# Patient Record
Sex: MALE | Race: WHITE
[De-identification: names, ages, dates, MRNs, and addresses within clinical notes are randomized per-mention and may not be internally consistent; named-entity substitution may affect disease eponyms.]

---

## 2019-03-30 ENCOUNTER — HOSPITAL ENCOUNTER (EMERGENCY)
Dept: HOSPITAL 92 - ERS | Age: 39
Discharge: ANOTHER HOSPITAL | End: 2019-03-30
Payer: SELF-PAY

## 2019-03-30 ENCOUNTER — HOSPITAL ENCOUNTER (EMERGENCY)
Dept: HOSPITAL 9 - MADERS | Age: 39
Discharge: ANOTHER HOSPITAL | End: 2019-03-30
Payer: SELF-PAY

## 2019-03-30 DIAGNOSIS — E87.6: ICD-10-CM

## 2019-03-30 DIAGNOSIS — E10.9: ICD-10-CM

## 2019-03-30 DIAGNOSIS — Z79.899: ICD-10-CM

## 2019-03-30 DIAGNOSIS — F17.210: ICD-10-CM

## 2019-03-30 DIAGNOSIS — Z79.82: ICD-10-CM

## 2019-03-30 DIAGNOSIS — E87.4: Primary | ICD-10-CM

## 2019-03-30 DIAGNOSIS — N18.9: ICD-10-CM

## 2019-03-30 DIAGNOSIS — Z79.4: ICD-10-CM

## 2019-03-30 DIAGNOSIS — T73.0XXA: Primary | ICD-10-CM

## 2019-03-30 DIAGNOSIS — E87.1: ICD-10-CM

## 2019-03-30 DIAGNOSIS — I12.9: ICD-10-CM

## 2019-03-30 LAB
ALBUMIN SERPL BCG-MCNC: 3.8 G/DL (ref 3.5–5)
ALBUMIN SERPL BCG-MCNC: 3.9 G/DL (ref 3.5–5)
ALP SERPL-CCNC: 75 U/L (ref 40–150)
ALP SERPL-CCNC: 79 U/L (ref 40–150)
ALT SERPL W P-5'-P-CCNC: 15 U/L (ref 8–55)
ALT SERPL W P-5'-P-CCNC: 17 U/L (ref 8–55)
ANION GAP SERPL CALC-SCNC: 16 MMOL/L (ref 10–20)
ANION GAP SERPL CALC-SCNC: 23 MMOL/L (ref 10–20)
AST SERPL-CCNC: 25 U/L (ref 5–34)
AST SERPL-CCNC: 28 U/L (ref 5–34)
BACTERIA UR QL AUTO: (no result) HPF
BASOPHILS # BLD AUTO: 0 THOU/UL (ref 0–0.2)
BASOPHILS # BLD AUTO: 0.1 THOU/UL (ref 0–0.2)
BASOPHILS NFR BLD AUTO: 0.4 % (ref 0–1)
BASOPHILS NFR BLD AUTO: 1 % (ref 0–1)
BICARBONATE (HCO3V): 28.6 MMOL/L (ref 22–28)
BILIRUB SERPL-MCNC: 0.7 MG/DL (ref 0.2–1.2)
BILIRUB SERPL-MCNC: 0.8 MG/DL (ref 0.2–1.2)
BUN SERPL-MCNC: 50 MG/DL (ref 8.9–20.6)
BUN SERPL-MCNC: 56 MG/DL (ref 8.9–20.6)
CA-I BLD-SCNC: 0.97 MMOL/L
CA-I BLDV-SCNC: 0.91 MMOL/L
CALCIUM SERPL-MCNC: 9 MG/DL (ref 7.8–10.44)
CALCIUM SERPL-MCNC: 9.1 MG/DL (ref 7.8–10.44)
CHLORIDE BLDV-SCNC: 94 MMOL/L (ref 98–107)
CHLORIDE SERPL-SCNC: 85 MMOL/L (ref 98–107)
CHLORIDE SERPL-SCNC: 93 MMOL/L (ref 98–107)
CHLORIDE SERPL-SCNC: 94 MMOL/L (ref 98–107)
CO2 BLDV CALC-SCNC: 27.9 MMOL/L (ref 22–28)
CO2 BLDV CALC-SCNC: 29.5 MMOL/L (ref 22–28)
CO2 SERPL-SCNC: 28 MMOL/L (ref 22–29)
CO2 SERPL-SCNC: 30 MMOL/L (ref 22–29)
CO2 TENSION (PVCO2): 28.6 MMHG (ref 40–50)
CREAT CL PREDICTED SERPL C-G-VRATE: 0 ML/MIN (ref 70–130)
CREAT CL PREDICTED SERPL C-G-VRATE: 0 ML/MIN (ref 70–130)
EOSINOPHIL # BLD AUTO: 0 THOU/UL (ref 0–0.7)
EOSINOPHIL # BLD AUTO: 0 THOU/UL (ref 0–0.7)
EOSINOPHIL NFR BLD AUTO: 0.1 % (ref 0–10)
EOSINOPHIL NFR BLD AUTO: 0.2 % (ref 0–10)
GLOBULIN SER CALC-MCNC: 2.7 G/DL (ref 2.4–3.5)
GLOBULIN SER CALC-MCNC: 3.5 G/DL (ref 2.4–3.5)
GLUCOSE SERPL-MCNC: 132 MG/DL (ref 70–105)
GLUCOSE SERPL-MCNC: 145 MG/DL (ref 70–105)
HCO3 BLDV-SCNC: 27.2 MMOL/L (ref 22–28)
HCT VFR BLD CALC: 38 % (ref 42–52)
HCT VFR BLD CALC: 39 % (ref 42–52)
HEMOGLOBIN - CALC: 13.3 G/DL (ref 14–18)
HGB BLD CALC-MCNC: 13 G/DL (ref 14–18)
HGB BLD-MCNC: 12.3 G/DL (ref 14–18)
HGB BLD-MCNC: 12.7 G/DL (ref 14–18)
LYMPHOCYTES # BLD AUTO: 1.5 THOU/UL (ref 1.2–3.4)
LYMPHOCYTES # BLD: 2 THOU/UL (ref 1.2–3.4)
LYMPHOCYTES NFR BLD AUTO: 18.5 % (ref 21–51)
LYMPHOCYTES NFR BLD AUTO: 26.2 % (ref 21–51)
MAGNESIUM SERPL-MCNC: 2.2 MG/DL (ref 1.6–2.6)
MCH RBC QN AUTO: 29.6 PG (ref 27–31)
MCH RBC QN AUTO: 30.8 PG (ref 27–31)
MCV RBC AUTO: 87.9 FL (ref 78–98)
MCV RBC AUTO: 90.2 FL (ref 78–98)
MONOCYTES # BLD AUTO: 0.6 THOU/UL (ref 0.11–0.59)
MONOCYTES # BLD AUTO: 0.8 THOU/UL (ref 0.11–0.59)
MONOCYTES NFR BLD AUTO: 10.1 % (ref 0–10)
MONOCYTES NFR BLD AUTO: 6.7 % (ref 0–10)
NEUTROPHILS # BLD AUTO: 4.8 THOU/UL (ref 1.4–6.5)
NEUTROPHILS # BLD AUTO: 6 THOU/UL (ref 1.4–6.5)
NEUTROPHILS NFR BLD AUTO: 62.5 % (ref 42–75)
NEUTROPHILS NFR BLD AUTO: 74.2 % (ref 42–75)
O2 TENSION (PVO2): 199.1 MMHG (ref 35–45)
O2 TENSION (PVO2): 46.4 MMHG (ref 35–45)
PCO2 BLDV: 22.7 MMHG (ref 40–50)
PLATELET # BLD AUTO: 226 THOU/UL (ref 130–400)
PLATELET # BLD AUTO: 240 THOU/UL (ref 130–400)
POTASSIUM BLDV-SCNC: 3.1 MMOL/L (ref 3.5–5.1)
POTASSIUM SERPL-SCNC: 3.3 MMOL/L (ref 3.5–5.1)
POTASSIUM SERPL-SCNC: 3.5 MMOL/L (ref 3.5–5.1)
POTASSIUM SERPL-SCNC: 3.9 MMOL/L (ref 3.5–5.1)
PROT UR STRIP.AUTO-MCNC: 30 MG/DL
RBC # BLD AUTO: 4 MILL/UL (ref 4.7–6.1)
RBC # BLD AUTO: 4.28 MILL/UL (ref 4.7–6.1)
RBC UR QL AUTO: (no result) HPF (ref 0–3)
SAO2 % BLDV FROM PO2: 89.6 % (ref 60–85)
SAO2 % BLDV FROM PO2: 99.9 % (ref 60–85)
SODIUM BLDV-SCNC: 129 MMOL/L (ref 138–145)
SODIUM SERPL-SCNC: 132 MMOL/L (ref 136–145)
SODIUM SERPL-SCNC: 132 MMOL/L (ref 138–145)
SODIUM SERPL-SCNC: 135 MMOL/L (ref 136–145)
SP GR UR STRIP: 1.01 (ref 1–1.04)
SP GR UR STRIP: 1.01 (ref 1–1.04)
SPERM UR QL AUTO: (no result) HPF
WBC # BLD AUTO: 7.7 THOU/UL (ref 4.8–10.8)
WBC # BLD AUTO: 8.1 THOU/UL (ref 4.8–10.8)

## 2019-03-30 PROCEDURE — 82803 BLOOD GASES ANY COMBINATION: CPT

## 2019-03-30 PROCEDURE — 96361 HYDRATE IV INFUSION ADD-ON: CPT

## 2019-03-30 PROCEDURE — 36415 COLL VENOUS BLD VENIPUNCTURE: CPT

## 2019-03-30 PROCEDURE — 82010 KETONE BODYS QUAN: CPT

## 2019-03-30 PROCEDURE — 82435 ASSAY OF BLOOD CHLORIDE: CPT

## 2019-03-30 PROCEDURE — 82330 ASSAY OF CALCIUM: CPT

## 2019-03-30 PROCEDURE — 81003 URINALYSIS AUTO W/O SCOPE: CPT

## 2019-03-30 PROCEDURE — 36416 COLLJ CAPILLARY BLOOD SPEC: CPT

## 2019-03-30 PROCEDURE — 84132 ASSAY OF SERUM POTASSIUM: CPT

## 2019-03-30 PROCEDURE — 84295 ASSAY OF SERUM SODIUM: CPT

## 2019-03-30 PROCEDURE — 85025 COMPLETE CBC W/AUTO DIFF WBC: CPT

## 2019-03-30 PROCEDURE — 85014 HEMATOCRIT: CPT

## 2019-03-30 PROCEDURE — 96375 TX/PRO/DX INJ NEW DRUG ADDON: CPT

## 2019-03-30 PROCEDURE — 96365 THER/PROPH/DIAG IV INF INIT: CPT

## 2019-03-30 PROCEDURE — 80053 COMPREHEN METABOLIC PANEL: CPT

## 2019-03-30 PROCEDURE — 81015 MICROSCOPIC EXAM OF URINE: CPT

## 2019-03-30 PROCEDURE — 99292 CRITICAL CARE ADDL 30 MIN: CPT

## 2019-03-30 PROCEDURE — 96374 THER/PROPH/DIAG INJ IV PUSH: CPT

## 2019-03-30 PROCEDURE — 83605 ASSAY OF LACTIC ACID: CPT

## 2019-03-30 PROCEDURE — 71045 X-RAY EXAM CHEST 1 VIEW: CPT

## 2019-03-30 PROCEDURE — 93005 ELECTROCARDIOGRAM TRACING: CPT

## 2019-03-30 PROCEDURE — 83735 ASSAY OF MAGNESIUM: CPT

## 2019-03-30 PROCEDURE — 96366 THER/PROPH/DIAG IV INF ADDON: CPT

## 2019-03-30 NOTE — RAD
PORTABLE CHEST:

3/30/19

 

HISTORY: 

Possible infection. 

 

Lungs appear clear. No infiltrate identified. Heart and mediastinum unremarkable. 

 

IMPRESSION:  

No acute findings. 

 

POS: [facility]

## 2019-03-30 NOTE — HP
PRIMARY CARE PROVIDER:  Lisa Bowling.



CHIEF COMPLAINT:  Nausea and vomiting.



HISTORY OF PRESENT ILLNESS:  Mr. Kaur is a pleasant 38-year-old gentleman, who was

seen at [facility] following transfer from [place]

[facility] on March 30, 2019. 



He reports that over the last 4 or 5 days, he has been having severe nausea and

vomiting.  He reports that he is unable to tolerate any diet.  He denies any fevers

or chills.  He denies any chest pain.  Denies any abdominal pain.  He reports

generalized weakness. 



REVIEW OF SYSTEMS:  All other systems reviewed and found to be negative.



PAST MEDICAL HISTORY:  Diabetes mellitus type 1; chronic kidney disease stage 3,

followed by Dr. Tyler; diabetic foot ulcer requiring left below-knee amputation;

retinal detachment. 



PAST SURGICAL HISTORY:  Vitrectomy and left below-knee amputation.



ALLERGIES:  NO KNOWN DRUG ALLERGIES.



CURRENT MEDICATIONS:  He is on Lantus insulin 36 units at bedtime.  He is also on

Humalog insulin by sliding scale. 



SOCIAL HISTORY:  He vapes.  He denies alcohol use or recreational drug use.



FAMILY HISTORY:  Hypertension in his father.



PHYSICAL EXAMINATION:

GENERAL:  On examination, Mr. Kaur is awake and alert, not in acute distress. 

VITAL SIGNS:  Blood pressure is 163/90, pulse 85, respiratory rate 18, and oxygen

saturation 94% on room air.  He is afebrile. 

EYES:  No scleral icterus.  No conjunctival pallor. 

ENT:  Dry mucosal membranes.  No oropharyngeal erythema or exudates. 

NECK:  Supple, nontender, trachea is midline. 

RESPIRATORY:  Accessory muscles of breathing are not active.  Chest wall movements

are symmetric bilaterally. 

LUNGS:  Clear to auscultation without wheeze, rhonchi, or crepitations. 

CARDIOVASCULAR:  S1 and S2 are heard, regular.  Peripheral pulses palpable.  No

carotid bruit.  No pericardial rub. 

ABDOMEN:  Soft, nontender.  Bowel sounds heard.  No hepatomegaly.  No splenomegaly. 

NEUROLOGIC:  Cranial nerves 2 through 12 are intact. 

MUSCULOSKELETAL:  The patient is able to move all four extremities. 

SKIN:  No rashes or subcutaneous nodules. 

LYMPHATIC:  No cervical lymphadenopathy. 

PSYCHIATRIC:  Normal mood.  Normal affect.  The patient is oriented to person,

place, and time. 



LABORATORY DATA:  Mr. Kaur's labs and investigations were reviewed.  He has normal

white count, normocytic anemia with hemoglobin 12.3, normal platelet count.  Venous

blood gas showing pH 7.6, pCO2 of 28.6, and pO2 of 46.4.  Urinalysis that is

positive for trace amount of ketones.  Beta-hydroxybutyrate is mildly elevated at

1.07.  He is hyponatremic with a sodium of 132; hypokalemic with a potassium of 3.3;

he has elevated blood urea nitrogen of 50; elevated creatinine of 2.61, last known

creatinine 2.77 earlier today and 2.31 on September 2, 2017.  Had unremarkable liver

profile.  Anion gap is 16.  Bicarb is elevated at 30. 



ASSESSMENT AND PLAN:  Mr. Kaur is a pleasant 38-year-old gentleman, who was seen at

[facility] on March 30, 2019.  His problem list includes: 

1. Nausea and vomiting:  Mr. Kaur is presenting with nausea and vomiting of unknown

etiology, could be secondary to viral gastroenteritis.  He will be admitted to the

hospital for further management.  We will provide him intravenous fluids for

dehydration.  We will start him on clear fluid diet and see if he can tolerate it. 

2. Acute on chronic stage 3 renal failure:  We will provide hydration and recheck

creatinine.  This is most likely prerenal given his history of recent nausea and

vomiting.  We will also consult Nephrology Service for opinion and help with

management. 

3. Diabetes mellitus type 1:  Initially, concern was regarding whether he had

diabetic ketoacidosis.  However, given the normal anion gap and elevated bicarb,

this does not appear likely.  He will be treated with intravenous fluids.  He will

also receive Lantus insulin. 

4. Metabolic alkalosis:  Most likely secondary to vomiting.  We will provide

intravenous hydration and recheck. 



Many thanks for allowing me to participate in your patient's care.  Please feel free

to contact me with any questions or concerns. 



LEVEL OF RISK:  Moderate.



LEVEL OF COMPLEXITY:  Moderate.







Job ID:  [id number]

## 2019-03-31 NOTE — CON
DATE OF CONSULTATION:  



REASON FOR CONSULTATION:  Elevated creatinine.



HISTORY OF PRESENT ILLNESS:  This is a very pleasant 38-year-old gentleman being

followed at the [facility] in [place].  He presents to the hospital with a 2-day

history of nausea and vomiting.  The patient denies chest pain.  No orthopnea, no

PND.  The patient has a history of diabetes mellitus and has a history of CKD stage

3 to stage 4.  Creatinine has been anywhere from 2 to 3 in the last few years.  The

patient has had significant vomiting, diarrhea, and an elevated bicarbonate. 



PAST MEDICAL HISTORY:  Significant for diabetes mellitus type 2, history of

diabetic, retinal detachment, vitrectomy, history of left knee below-knee

amputation, history of cardiac CKD stage 3 to 4. 



ALLERGIES:  NO KNOWN DRUG ALLERGIES.



FAMILY HISTORY:  Negative for ESRD.



HOME MEDICATION:  Medication list reviewed.



REVIEW OF SYSTEMS:  A 15-point review of system was performed negative except for

positives noted above. 



REVIEW OF SYSTEMS:  A 12-point review of systems was performed and was negative

except for positives noted above. 

NECK:  No swelling or lumps. 

NOSE:  No epistaxis or discharge.   

EYES:  No diplopia or pain. 

MUSCULOSKELETAL:  No joint pain. 

NEUROPSYCHIATIC SYSTEMS:  No suicidal ideation.  No ideation. 

SKIN:  Denies any rash or ulcer. 

CONSTITUTIONAL:   No fever or chills.



OBJECTIVE:  See above. 

Awake, alert, in no acute distress. 

VITAL SIGNS:  Have been reviewed. 

GENERAL APPEARANCE AND MENTAL STATUS:  Fair. 

HEAD/NECK:  Normocephalic.   Atraumatic. 

EYES:  EOMI.  No deformity. 

EARS:  Clear.  No ulcers. 

NOSE:   Intact.  No lesions. 

MOUTH:  Clear.  No discharge. 

THROAT:  Clear.  No exudate. 

LUNGS:   Clear.  No crackles. 

CARDIAC:   S1, S2.  No rub. 

ABDOMEN:   Benign.  Bowel sounds positive. 

GENITALIA/RECTUM:  Hebert absent. 

BACK/EXTREMITIES:  Edema 0+. 

NEUROLOGICAL:  Alert and motor intact.



LABORATORY DATA:  Show potassium is 3.9.



ASSESSMENT AND PLAN:  

1. Chronic kidney disease, stage 4 with acute kidney injury, likely due to decreased

effective arterial blood volume.  I will continue normal saline at 50 mL/hour. 

2. Hypertension, stable.

3. Anemia, stable. Medication based on GFR appropriate.  No indication for dialysis

at this time. 







Job ID:  [id number]

## 2019-04-01 LAB
BASE EXCESS BLDA CALC-SCNC: 7.5 MMOL/L
CA-I BLDA-SCNC: 1 MMOL/L (ref 1.15–1.33)
CHLORIDE BLDA-SCNC: 93 MMOL/L (ref 98–107)
HCT VFR BLDA CALC: 36 % (ref 38–51)
HGB BLD CALC-MCNC: 12.2 G/DL (ref 12–17)
PCO2 BLDA: 24.7 MMHG (ref 35–48)
PH BLDA: 7.65 [PH] (ref 7.35–7.45)
PO2 BLDA: 79.3 MMHG (ref 83–108)
POTASSIUM BLDA-SCNC: 3.2 MMOL/L (ref 3.5–4.5)
SAO2 % BLDA FROM PO2: 98 % (ref 94–98)
SODIUM BLDA-SCNC: 129 MMOL/L (ref 138–146)

## 2019-04-01 NOTE — DIS
DATE OF ADMISSION:  03/30/2019



DATE OF DISCHARGE:  03/30/2019



The patient left against medical advice on March 30, 2019.



HOSPITAL COURSE:  Mr. Kaur is a pleasant 38-year-old gentleman, who was admitted to

[facility] on March 30, 2019 for nausea and vomiting as well

as acute on chronic stage 3 renal failure.  Please refer to my history and physical

note, dated March 30, 2019, for further details. 



Shortly after admission, Mr. Kaur left the hospital against medical advice.







Job ID:  [id number]

## 2020-02-25 ENCOUNTER — HOSPITAL ENCOUNTER (INPATIENT)
Dept: HOSPITAL 92 - ERS | Age: 40
LOS: 2 days | Discharge: HOME | DRG: 391 | End: 2020-02-27
Attending: HOSPITALIST | Admitting: INTERNAL MEDICINE
Payer: COMMERCIAL

## 2020-02-25 ENCOUNTER — HOSPITAL ENCOUNTER (EMERGENCY)
Dept: HOSPITAL 9 - MADERS | Age: 40
Discharge: ANOTHER HOSPITAL | End: 2020-02-25
Payer: MEDICAID

## 2020-02-25 VITALS — BODY MASS INDEX: 26.8 KG/M2

## 2020-02-25 DIAGNOSIS — E10.22: ICD-10-CM

## 2020-02-25 DIAGNOSIS — E10.10: ICD-10-CM

## 2020-02-25 DIAGNOSIS — Z79.4: ICD-10-CM

## 2020-02-25 DIAGNOSIS — Z79.82: ICD-10-CM

## 2020-02-25 DIAGNOSIS — A08.4: Primary | ICD-10-CM

## 2020-02-25 DIAGNOSIS — E10.43: ICD-10-CM

## 2020-02-25 DIAGNOSIS — E78.5: ICD-10-CM

## 2020-02-25 DIAGNOSIS — E10.10: Primary | ICD-10-CM

## 2020-02-25 DIAGNOSIS — F17.210: ICD-10-CM

## 2020-02-25 DIAGNOSIS — N17.9: ICD-10-CM

## 2020-02-25 DIAGNOSIS — Z79.899: ICD-10-CM

## 2020-02-25 DIAGNOSIS — N28.9: ICD-10-CM

## 2020-02-25 DIAGNOSIS — I10: ICD-10-CM

## 2020-02-25 DIAGNOSIS — Z89.512: ICD-10-CM

## 2020-02-25 DIAGNOSIS — N18.4: ICD-10-CM

## 2020-02-25 DIAGNOSIS — E10.59: ICD-10-CM

## 2020-02-25 DIAGNOSIS — K31.84: ICD-10-CM

## 2020-02-25 DIAGNOSIS — F17.290: ICD-10-CM

## 2020-02-25 LAB
ALBUMIN SERPL BCG-MCNC: 4.1 G/DL (ref 3.5–5)
ALP SERPL-CCNC: 71 U/L (ref 40–110)
ALT SERPL W P-5'-P-CCNC: 16 U/L (ref 8–55)
ANION GAP SERPL CALC-SCNC: 17 MMOL/L (ref 10–20)
ANION GAP SERPL CALC-SCNC: 19 MMOL/L (ref 10–20)
ANION GAP SERPL CALC-SCNC: 19 MMOL/L (ref 10–20)
ANION GAP SERPL CALC-SCNC: 31 MMOL/L (ref 10–20)
AST SERPL-CCNC: 15 U/L (ref 5–34)
BASOPHILS # BLD AUTO: 0 THOU/UL (ref 0–0.2)
BASOPHILS NFR BLD AUTO: 0.3 % (ref 0–1)
BILIRUB SERPL-MCNC: 0.5 MG/DL (ref 0.2–1.2)
BUN SERPL-MCNC: 59 MG/DL (ref 8.9–20.6)
BUN SERPL-MCNC: 60 MG/DL (ref 8.9–20.6)
CALCIUM SERPL-MCNC: 9.2 MG/DL (ref 7.8–10.44)
CALCIUM SERPL-MCNC: 9.7 MG/DL (ref 7.8–10.44)
CALCIUM SERPL-MCNC: 9.7 MG/DL (ref 7.8–10.44)
CALCIUM SERPL-MCNC: 9.8 MG/DL (ref 7.8–10.44)
CHLORIDE SERPL-SCNC: 105 MMOL/L (ref 98–107)
CHLORIDE SERPL-SCNC: 106 MMOL/L (ref 98–107)
CHLORIDE SERPL-SCNC: 106 MMOL/L (ref 98–107)
CHLORIDE SERPL-SCNC: 96 MMOL/L (ref 98–107)
CO2 SERPL-SCNC: 16 MMOL/L (ref 22–29)
CO2 SERPL-SCNC: 23 MMOL/L (ref 22–29)
CO2 SERPL-SCNC: 24 MMOL/L (ref 22–29)
CO2 SERPL-SCNC: 26 MMOL/L (ref 22–29)
CREAT CL PREDICTED SERPL C-G-VRATE: 0 ML/MIN (ref 70–130)
EOSINOPHIL # BLD AUTO: 0 THOU/UL (ref 0–0.7)
EOSINOPHIL NFR BLD AUTO: 0 % (ref 0–10)
GLOBULIN SER CALC-MCNC: 3.2 G/DL (ref 2.4–3.5)
GLUCOSE SERPL-MCNC: 159 MG/DL (ref 70–105)
GLUCOSE SERPL-MCNC: 178 MG/DL (ref 70–105)
GLUCOSE SERPL-MCNC: 211 MG/DL (ref 70–105)
GLUCOSE SERPL-MCNC: 646 MG/DL (ref 70–105)
HGB BLD-MCNC: 11.7 G/DL (ref 14–18)
LYMPHOCYTES # BLD AUTO: 1.1 THOU/UL (ref 1.2–3.4)
LYMPHOCYTES NFR BLD AUTO: 7.5 % (ref 21–51)
MAGNESIUM SERPL-MCNC: 1.9 MG/DL (ref 1.6–2.6)
MCH RBC QN AUTO: 29 PG (ref 27–31)
MCV RBC AUTO: 93.9 FL (ref 78–98)
MONOCYTES # BLD AUTO: 0.7 THOU/UL (ref 0.11–0.59)
MONOCYTES NFR BLD AUTO: 4.5 % (ref 0–10)
NEUTROPHILS # BLD AUTO: 12.6 THOU/UL (ref 1.4–6.5)
NEUTROPHILS NFR BLD AUTO: 87.7 % (ref 42–75)
PLATELET # BLD AUTO: 230 THOU/UL (ref 130–400)
POTASSIUM SERPL-SCNC: 3.8 MMOL/L (ref 3.5–5.1)
POTASSIUM SERPL-SCNC: 3.9 MMOL/L (ref 3.5–5.1)
POTASSIUM SERPL-SCNC: 3.9 MMOL/L (ref 3.5–5.1)
POTASSIUM SERPL-SCNC: 4.7 MMOL/L (ref 3.5–5.1)
RBC # BLD AUTO: 4.05 MILL/UL (ref 4.7–6.1)
SODIUM SERPL-SCNC: 138 MMOL/L (ref 136–145)
SODIUM SERPL-SCNC: 144 MMOL/L (ref 136–145)
SODIUM SERPL-SCNC: 144 MMOL/L (ref 136–145)
SODIUM SERPL-SCNC: 145 MMOL/L (ref 136–145)
WBC # BLD AUTO: 14.3 THOU/UL (ref 4.8–10.8)

## 2020-02-25 PROCEDURE — 85025 COMPLETE CBC W/AUTO DIFF WBC: CPT

## 2020-02-25 PROCEDURE — 80053 COMPREHEN METABOLIC PANEL: CPT

## 2020-02-25 PROCEDURE — 96365 THER/PROPH/DIAG IV INF INIT: CPT

## 2020-02-25 PROCEDURE — 80048 BASIC METABOLIC PNL TOTAL CA: CPT

## 2020-02-25 PROCEDURE — 36415 COLL VENOUS BLD VENIPUNCTURE: CPT

## 2020-02-25 PROCEDURE — 81001 URINALYSIS AUTO W/SCOPE: CPT

## 2020-02-25 PROCEDURE — 83605 ASSAY OF LACTIC ACID: CPT

## 2020-02-25 PROCEDURE — 84484 ASSAY OF TROPONIN QUANT: CPT

## 2020-02-25 PROCEDURE — 93005 ELECTROCARDIOGRAM TRACING: CPT

## 2020-02-25 PROCEDURE — 96375 TX/PRO/DX INJ NEW DRUG ADDON: CPT

## 2020-02-25 PROCEDURE — 71045 X-RAY EXAM CHEST 1 VIEW: CPT

## 2020-02-25 PROCEDURE — 36416 COLLJ CAPILLARY BLOOD SPEC: CPT

## 2020-02-25 PROCEDURE — 96366 THER/PROPH/DIAG IV INF ADDON: CPT

## 2020-02-25 PROCEDURE — 96376 TX/PRO/DX INJ SAME DRUG ADON: CPT

## 2020-02-25 PROCEDURE — 74176 CT ABD & PELVIS W/O CONTRAST: CPT

## 2020-02-25 PROCEDURE — 76770 US EXAM ABDO BACK WALL COMP: CPT

## 2020-02-25 PROCEDURE — 83735 ASSAY OF MAGNESIUM: CPT

## 2020-02-25 PROCEDURE — 94760 N-INVAS EAR/PLS OXIMETRY 1: CPT

## 2020-02-25 NOTE — RAD
Portable chest:



HISTORY: Tachycardia



COMPARISON:  none



FINDINGS: Lung fields are clear. Heart and mediastinum appear unremarkable.  Vascularity is normal.

 Visualized osseous structures unremarkable.



IMPRESSION: No acute finding



Reported By: Dameon Chaudhari 

Electronically Signed:  2/25/2020 11:26 AM

## 2020-02-25 NOTE — RAD
Portable frontal chest radiograph:

2/25/2020



COMPARISON: 2/25/2020



HISTORY: Short of breath



FINDINGS: Lungs are clear. Heart and mediastinal contours appear within normal limits.



IMPRESSION: No acute findings.



Reported By: Rudolph Zimmer 

Electronically Signed:  2/25/2020 10:16 PM

## 2020-02-25 NOTE — PDOC.EVN
Event Note





- Event Note


Event Note: 





Will change Insulin drip tp SQ. Added sliding scale. DC electrolye protocol

## 2020-02-25 NOTE — ULT
Renal ultrasound:

2/25/2020



COMPARISON:None available



HISTORY:Acute kidney insufficiency



TECHNIQUE: Multiplanar grayscale sonographic imaging of the kidneys and urinary bladder obtained.



FINDINGS: The right kidney [id number] x 4.7 x 5.1 cm and demonstrates no stone, hydronephrosis, or 
mass.



The left kidney [id number] x 5.6 x 5.9 cm and demonstratesno stone, hydronephrosis, or mass lesion.




The urinary bladderappears grossly unremarkable.



IMPRESSION:No acute findings



Reported By: Rudolph Zimmer 

Electronically Signed:  2/25/2020 6:50 PM

## 2020-02-25 NOTE — PDOC.HHP
Hospitalist HPI





- History of Present Illness


DKA


History of Present Illness: 





39-year-old gentleman with past medical history of type I diabetes, hypertension

, hyperlipidemia, diabetic gastroparesis, and left below the knee amputation 

presents with five days of nausea vomiting and diarrhea, diagnosed with 

diabetic ketoacidosis. Patient was transferred to [facility] 

on 2/25/2020 with diabetic ketoacidosis. Initially on presentation patient's 

blood sugars for greater than 600 and he met criteria for diabetic 

ketoacidosis. I find the patient in the emergency department he is laying flat 

in bed in mild acute distress, his mother is at bedside able to aid in history. 

Patient tells me that he is at five days of nausea, vomiting, and diarrhea. 

Diarrhea has been watery without blood or black. Vomitus has been yellow/ green 

bilious without blood or black. Patient does state that he has been in contact 

with sick kids at home. Patient diagnosed with acute kidney injury present on 

admission. I ordered a CT scan of the abdomen and pelvis, please see full 

report for details, there is no acute intra-abdominal pathology to explain 

patient symptoms. Patient with viral gastroenteritis and diabetic ketoacidosis 

present on admission.





Hospitalist ROS





- Review of Systems


All other systems reviewed; all pertinent +/- noted in HPI/Subj





- Medication


Medications: 


Active Medications











Generic Name Dose Route Start Last Admin





  Trade Name Freq  PRN Reason Stop Dose Admin


 


Metoclopramide HCl  10 mg  02/25/20 16:12  02/25/20 19:55





  Reglan  IVP   10 mg





  Q6H PRN   Administration





  Nausea/Vomiting   





     





     





     














Hospitalist History





- Past Medical History


Source: patient, family, old records


Cardiac: reports: HTN, Hyperlipidemia


Gastrointestinal: reports: Other (DM gastroparesis)


Musculoskeletal: reports: Other (Left BKA)





- Past Surgical History


Past Surgical History: reports: Other (Left BKA)





- Social History


Smoking Status: Current every day smoker (E-cigarettes)


Alcohol: reports: None


Drugs: reports: none


Living Situation: With Family


Domestic Violence: Negative


Activity level: uses cane/walker





- Exam


General Appearance: ill appearing


Eye: PERRL


ENT: normocephalic atraumatic, moist mucosa


Neck: supple, symmetric, no lymphadenopathy


Heart: no murmur, no gallops, no rubs, normal peripheral pulses


Respiratory: CTAB, no wheezes, no rales, no ronchi, normal chest expansion, no 

tachypnea


Gastrointestinal: soft, non-tender, non-distended, no guarding, no rigidity


Extremities: no edema


Skin: no lesions, no rashes


Skin - other findings: Right foot no lesions or infections


Neurological: cranial nerve grossly intact, normal sensation to touch, no focal 

deficits


Musculoskeletal: generalized weakness


Musculoskeletal - other findings: Left BKA.


Psychiatric: normal affect, normal behavior, A&O x 3





Hospitalist Results





- Labs


Result Diagrams: 


 02/25/20 16:42


Lab results: 


 











Sodium  144 mmol/L (136-145)   02/25/20  16:42    


 


Potassium  3.9 mmol/L (3.5-5.1)   02/25/20  16:42    


 


Chloride  106 mmol/L ()   02/25/20  16:42    


 


Carbon Dioxide  23 mmol/L (22-29)   02/25/20  16:42    


 


BUN  60 mg/dL (8.9-20.6)  H  02/25/20  16:42    


 


Creatinine  3.87 mg/dL (0.7-1.3)  H  02/25/20  16:42    


 


Glucose  159 mg/dL ()  H  02/25/20  16:42    


 


Calcium  9.7 mg/dL (7.8-10.44)   02/25/20  16:42    














- Radiology Interpretation


  ** CT scan - abdomen


Status: image reviewed by me





Hospitalist H&P A/P





- Problem


(1) DKA (diabetic ketoacidoses)


Code(s): E11.10 - TYPE 2 DIABETES MELLITUS WITH KETOACIDOSIS WITHOUT COMA   

Status: Acute   





(2) HTN (hypertension)


Code(s): I10 - ESSENTIAL (PRIMARY) HYPERTENSION   Status: Acute   





(3) HLD (hyperlipidemia)


Code(s): E78.5 - HYPERLIPIDEMIA, UNSPECIFIED   Status: Acute   





(4) Gastroparesis due to DM


Code(s): E11.43 - TYPE 2 DIABETES W DIABETIC AUTONOMIC (POLY)NEUROPATHY; K31.84 

- GASTROPARESIS   Status: Acute   





(5) Viral gastroenteritis


Code(s): A08.4 - VIRAL INTESTINAL INFECTION, UNSPECIFIED   Status: Acute   





(6) ANTONY (acute kidney injury)


Code(s): N17.9 - ACUTE KIDNEY FAILURE, UNSPECIFIED   Status: Acute   





(7) Diabetes


Code(s): E11.9 - TYPE 2 DIABETES MELLITUS WITHOUT COMPLICATIONS   Status: Acute

   


Qualifiers: 


   Diabetes mellitus type: type 1 





(8) Intractable nausea and vomiting


Code(s): R11.2 - NAUSEA WITH VOMITING, UNSPECIFIED   Status: Acute   





- Plan


Plan: 





Plan:


admit to intermediate medical care unit


DKA protocol


insulin drip


replace electrolytes as needed


IV fluid resuscitation for acute kidney injury


Renal US negative for obstructive process to explain ANTONY


CT scan of of the abdomen and pelvis demonstrates no acute intra-abdominal 

pathology to to explain nausea, vomiting, and diarrhea


viral gastroenteritis will need to run its course


Stress reactive leukocytosis at 14k from transferring facility


no indication for antibiotics


Add UA and CXR 


No skin infections on the feet or stump


continue home medications as able


Hold Ace inh and statin with ANTONY


symptomatic control of nausea and vomiting


Patient with DM gastroparesis 


blood pressure control 


blood sugar control


GI prophylaxis


DVT prophylaxis

## 2020-02-25 NOTE — CT
CT OF THE ABDOMEN AND PELVIS WITHOUT IV CONTRAST



INDICATION: History of DKA and abdominal pain



COMPARISON: CT the abdomen and pelvis without contrast dated July 2, 2012



FINDINGS: The lack of IV contrast limits evaluation of the solid organs of the abdomen and pelvis.





ABDOMEN:



Lung bases: Clear



Liver: No focal lesion.



Gallbladder:  Normal appearing.



Pancreas: Normal.



Adrenal glands: Normal.



Spleen: Normal.



Kidneys and ureters: Normal.  No hydronephrosis.



Vasculature: There are mild vascular calcifications seen involving the visualized vasculature.



Lymph nodes:No lymphadenopathy.



Free fluid in abdomen:No free fluid is evident.





PELVIS:



Small and large bowel: Normal



Appendix:Normal



Bladder: Normal.



Rectal and perirectal soft tissues:Normal.



Reproductive structures: Normal.



Free fluid in pelvis: No free fluid is evident.



Lymphadenopathy pelvis: No lymphadenopathy is evident.



Osseous structures: No acute osseous abnormality.  No destructive osteolytic or osteoblastic lesion i
s identified.  There is scattered degenerative and osteoarthritic changes.



Soft tissues:Normal.



IMPRESSION:

1. No acute abnormality.



Reported By: Dae Clarke 

Electronically Signed:  2/25/2020 4:56 PM

## 2020-02-26 LAB
ANION GAP SERPL CALC-SCNC: 17 MMOL/L (ref 10–20)
BASOPHILS # BLD AUTO: 0 THOU/UL (ref 0–0.2)
BASOPHILS NFR BLD AUTO: 0.1 % (ref 0–1)
BUN SERPL-MCNC: 57 MG/DL (ref 8.9–20.6)
CALCIUM SERPL-MCNC: 9.1 MG/DL (ref 7.8–10.44)
CHLORIDE SERPL-SCNC: 101 MMOL/L (ref 98–107)
CO2 SERPL-SCNC: 27 MMOL/L (ref 22–29)
CREAT CL PREDICTED SERPL C-G-VRATE: 0 ML/MIN (ref 70–130)
EOSINOPHIL # BLD AUTO: 0 THOU/UL (ref 0–0.7)
EOSINOPHIL NFR BLD AUTO: 0.1 % (ref 0–10)
GLUCOSE SERPL-MCNC: 232 MG/DL (ref 70–105)
GLUCOSE UR STRIP-MCNC: >=1000 MG/DL
HGB BLD-MCNC: 10.9 G/DL (ref 14–18)
LYMPHOCYTES # BLD: 1.7 THOU/UL (ref 1.2–3.4)
LYMPHOCYTES NFR BLD AUTO: 10.8 % (ref 21–51)
MCH RBC QN AUTO: 30.2 PG (ref 27–31)
MCV RBC AUTO: 93.3 FL (ref 78–98)
MONOCYTES # BLD AUTO: 0.9 THOU/UL (ref 0.11–0.59)
MONOCYTES NFR BLD AUTO: 5.9 % (ref 0–10)
NEUTROPHILS # BLD AUTO: 13.3 THOU/UL (ref 1.4–6.5)
NEUTROPHILS NFR BLD AUTO: 83.2 % (ref 42–75)
PLATELET # BLD AUTO: 208 THOU/UL (ref 130–400)
POTASSIUM SERPL-SCNC: 3.9 MMOL/L (ref 3.5–5.1)
PROT UR STRIP.AUTO-MCNC: 70 MG/DL
RBC # BLD AUTO: 3.62 MILL/UL (ref 4.7–6.1)
RBC UR QL AUTO: (no result) HPF (ref 0–3)
SODIUM SERPL-SCNC: 141 MMOL/L (ref 136–145)
WBC # BLD AUTO: 16 THOU/UL (ref 4.8–10.8)
WBC UR QL AUTO: (no result) HPF (ref 0–3)

## 2020-02-26 RX ADMIN — ASPIRIN 81 MG CHEWABLE TABLET SCH: 81 TABLET CHEWABLE at 16:51

## 2020-02-26 RX ADMIN — HEPARIN SODIUM SCH UNITS: 5000 INJECTION, SOLUTION INTRAVENOUS; SUBCUTANEOUS at 16:28

## 2020-02-26 RX ADMIN — Medication SCH: at 09:21

## 2020-02-26 RX ADMIN — INSULIN HUMAN PRN UNIT: 100 INJECTION, SOLUTION PARENTERAL at 05:50

## 2020-02-26 RX ADMIN — ONDANSETRON PRN MG: 2 INJECTION INTRAMUSCULAR; INTRAVENOUS at 16:28

## 2020-02-26 RX ADMIN — ONDANSETRON PRN MG: 2 INJECTION INTRAMUSCULAR; INTRAVENOUS at 09:15

## 2020-02-26 RX ADMIN — INSULIN GLARGINE SCH MLS: 100 INJECTION, SOLUTION SUBCUTANEOUS at 09:22

## 2020-02-26 RX ADMIN — INSULIN HUMAN PRN UNIT: 100 INJECTION, SOLUTION PARENTERAL at 13:08

## 2020-02-26 RX ADMIN — ONDANSETRON PRN MG: 2 INJECTION INTRAMUSCULAR; INTRAVENOUS at 00:01

## 2020-02-26 RX ADMIN — INSULIN HUMAN PRN UNIT: 100 INJECTION, SOLUTION PARENTERAL at 08:14

## 2020-02-26 RX ADMIN — HEPARIN SODIUM SCH UNITS: 5000 INJECTION, SOLUTION INTRAVENOUS; SUBCUTANEOUS at 09:17

## 2020-02-26 RX ADMIN — ASPIRIN 81 MG CHEWABLE TABLET SCH MG: 81 TABLET CHEWABLE at 09:17

## 2020-02-26 RX ADMIN — HEPARIN SODIUM SCH: 5000 INJECTION, SOLUTION INTRAVENOUS; SUBCUTANEOUS at 01:08

## 2020-02-26 RX ADMIN — HEPARIN SODIUM SCH UNITS: 5000 INJECTION, SOLUTION INTRAVENOUS; SUBCUTANEOUS at 21:14

## 2020-02-26 NOTE — PDOC.HOSPP
- Subjective


Encounter Date: 02/26/20


Encounter Time: 11:56


Subjective: 





still nauseated. no po intake yet. d/w pt, mom and rn.





- Objective


Vital Signs & Weight: 


 Vital Signs (12 hours)











  Temp Pulse Resp BP Pulse Ox


 


 02/26/20 11:57  98.7 F  94  20  178/89 H 


 


 02/26/20 08:09  98.8 F  87  20  164/84 H  96


 


 02/26/20 04:35  98.8 F  91  18  170/83 H  95


 


 02/26/20 04:00      98








 Weight











Weight                         250 lb 0.067 oz














I&O: 


 











 02/25/20 02/26/20 02/27/20





 06:59 06:59 06:59


 


Intake Total  2550 


 


Output Total  1800 


 


Balance  750 











Result Diagrams: 


 02/26/20 05:54





 02/26/20 05:54


Additional Labs: 


 Accuchecks











  02/26/20 02/26/20 02/26/20





  11:18 08:12 05:17


 


POC Glucose  158 H  202 H  235 H














  02/25/20 02/25/20 02/25/20





  23:24 22:32 21:28


 


POC Glucose  177 H  158 H  200 H














  02/25/20 02/25/20 02/25/20





  20:23 19:20 17:48


 


POC Glucose  157 H  174 H  143 H














  02/25/20





  16:59


 


POC Glucose  139 H














Hospitalist ROS





- Medication


Medications: 


Active Medications











Generic Name Dose Route Start Last Admin





  Trade Name Freq  PRN Reason Stop Dose Admin


 


Aspirin  81 mg  02/26/20 09:00  02/26/20 09:17





  Aspirin Chewable  PO   81 mg





  DAILY PASQUALE   Administration





     





     





     





     


 


Famotidine  20 mg  02/25/20 21:00  02/26/20 01:44





  Pepcid  PO   Not Given





  QPM PASQUALE   





     





     





     





     


 


Heparin Sodium (Porcine)  5,000 units  02/25/20 21:00  02/26/20 09:17





  Heparin  SC   5,000 units





  TID PASQUALE   Administration





     





     





     





     


 


Sodium Chloride  1,000 mls @ 150 mls/hr  02/25/20 23:30  02/26/20 09:15





  1/2 Normal Saline  IV   1,000 mls





  .Q6H40M PASQUALE   Administration





     





     





     





     


 


Promethazine HCl 25 mg/ Sodium  51 mls @ 204 mls/hr  02/26/20 03:02  02/26/20 13

:09





  Chloride  IVPB   51 mls





  Q6H PRN   Administration





  Nausea   





     





     





     


 


Insulin Glargine 14 units/  0.14 mls @ 0 mls/hr  02/26/20 09:00  02/26/20 09:22





  Miscellaneous Medication  SC   0.14 mls





  QAM PASQUALE   Administration





     





     





     





     


 


Insulin Human Regular  0 units  02/25/20 23:16  02/26/20 13:08





  Humulin R  SC   2 unit





  .MODERATE SLIDING SC PRN   Administration





  Moderate Correctional Scale   





     





     





     


 


Metoclopramide HCl  10 mg  02/25/20 16:12  02/25/20 19:55





  Reglan  IVP   10 mg





  Q6H PRN   Administration





  Nausea/Vomiting   





     





     





     


 


Metoprolol Tartrate  25 mg  02/26/20 09:00  02/26/20 09:17





  Lopressor  PO   25 mg





  BID PASQUALE   Administration





     





     





     





     


 


Ondansetron HCl  4 mg  02/25/20 16:16  02/26/20 09:15





  Zofran  IVP   4 mg





  Q6H PRN   Administration





  Nausea/Vomiting   





     





     





     


 


Sodium Chloride  10 ml  02/26/20 09:00  02/26/20 09:21





  Flush - Normal Saline  IVF   Not Given





  Q12HR PASQUALE   





     





     





     





     














- Exam


General Appearance: NAD, awake alert


Eye: PERRL


ENT: normocephalic atraumatic


Neck: supple


Heart: RRR


Respiratory: CTAB


Gastrointestinal: normal bowel sounds





Hosp A/P





- Plan








(1) DKA (diabetic ketoacidoses)


Code(s): E11.10 - TYPE 2 DIABETES MELLITUS WITH KETOACIDOSIS WITHOUT COMA   

Status: Acute   





(2) HTN (hypertension)


Code(s): I10 - ESSENTIAL (PRIMARY) HYPERTENSION   Status: Acute   





(3) HLD (hyperlipidemia)


Code(s): E78.5 - HYPERLIPIDEMIA, UNSPECIFIED   Status: Acute   





(4) Gastroparesis due to DM


Code(s): E11.43 - TYPE 2 DIABETES W DIABETIC AUTONOMIC (POLY)NEUROPATHY; K31.84 

- GASTROPARESIS   Status: Acute   





(5) Viral gastroenteritis


Code(s): A08.4 - VIRAL INTESTINAL INFECTION, UNSPECIFIED   Status: Acute   





(6) ANTONY (acute kidney injury)


Code(s): N17.9 - ACUTE KIDNEY FAILURE, UNSPECIFIED   Status: Acute   





(7) Diabetes


Code(s): E11.9 - TYPE 2 DIABETES MELLITUS WITHOUT COMPLICATIONS   Status: Acute

   


Qualifiers: 


   Diabetes mellitus type: type 1 





(8) Intractable nausea and vomiting


Code(s): R11.2 - NAUSEA WITH VOMITING, UNSPECIFIED   Status: Acute   





DKA protocol


insulin drip---STopped


replace electrolytes as needed


IV fluid resuscitation for acute kidney injury


Renal US negative for obstructive process to explain ANTONY---neg


CT scan of of the abdomen and pelvis demonstrates no acute intra-abdominal 

pathology to to explain nausea, vomiting, and diarrhea


viral gastroenteritis will need to run its course


Stress reactive leukocytosis at 14k---->16K on 26th


no indication for antibiotics





DM gastroparesis 


blood pressure control 


blood sugar control





am labs


CLD -- advqance to diabetic diet, when tolerated.

## 2020-02-26 NOTE — CON
DATE OF CONSULTATION:  



REASON FOR CONSULTATION:  CKD.



HISTORY OF PRESENT ILLNESS:  This is a very pleasant 39-year-old gentleman,

presented with a creatinine of 4, which dropped to 3.3.  The patient has known CKD

and has been followed up in the CKD, iatrogenic.  The patient was admitted for DKA

with nausea and vomiting.  The patient's creatinine has improved over the last few

days. 



PAST MEDICAL HISTORY:  Hypertension, diabetes mellitus, history of diarrhea,

gastroparesis, left below-knee amputation, and history of left BKA. 



SOCIOECONOMIC HISTORY:  No alcohol or drug abuse.



FAMILY HISTORY:  Negative for ESRD.



ALLERGIES:  REVIEWED.



MEDICATIONS:  Home medications list reviewed. 



Hospital medications list reviewed.



REVIEW OF SYSTEMS:  Fifteen-point review of system was performed and negative except

for positives noted above. 

HEENT:  Eyes intact, no diplopia.  Ears:  No hearing loss or earache.  Nose:  No

discharge or bleeding. 

CHEST:  No cough or phlegm. 

ABDOMEN:  No nausea or vomiting. 

GENITOURINARY:  No hematuria.  No Hebert catheter. 

MUSCULOSKELETAL:  No low back pain.  No joint swelling or pain. 

NEUROLOGICAL:  No syncope.  No seizures. 

SKIN:  No complaints of rash or itching. 

PSYCHIATRIC:  No depression. 

CONSTITUTIONAL:  No weight loss or loss of appetite.



PHYSICAL EXAMINATION:

GENERAL:  The patient is awake and alert. 

VITAL signs:  Afebrile, pulse 95, breathing at 16, blood pressure 160/84. 

HEENT:  Head normocephalic and atraumatic.  Eyes intact, no ulcers.  Nose intact, no

ulcers.  Ears intact, no ulcers. 

NECK:  Supple.  No JVD. 

CHEST:  Symmetrical and clear. 

CARDIOVASCULAR:  Shows S1 and S2, no rub, no murmur. 

GASTROINTESTINAL:  Abdomen is soft, bowel sounds positive. 

EXTREMITIES:  Show no edema or ulcers. 

SKIN:  Shows no rash or petechiae. 

MUSCULOSKELETAL:  Shows no joint swelling or stiffness. 

GENITOURINARY:  Shows no Hebert or CVA tenderness. 

NEUROLOGIC:  Motor intact.  Cranial nerves intact.



LABORATORY DATA:  Reviewed.



ASSESSMENT AND PLAN:  

1. Stage 4 chronic kidney disease, stable.

2. Hypertension, stable.  I would recommend titrating medications.

3. Acute kidney injury, improved.  No indication for dialysis.







Job ID:  [id number]

## 2020-02-27 VITALS — SYSTOLIC BLOOD PRESSURE: 151 MMHG | DIASTOLIC BLOOD PRESSURE: 68 MMHG | TEMPERATURE: 99.1 F

## 2020-02-27 LAB
ANION GAP SERPL CALC-SCNC: 17 MMOL/L (ref 10–20)
BASOPHILS # BLD AUTO: 0 THOU/UL (ref 0–0.2)
BASOPHILS NFR BLD AUTO: 0.3 % (ref 0–1)
BUN SERPL-MCNC: 42 MG/DL (ref 8.9–20.6)
CALCIUM SERPL-MCNC: 8.3 MG/DL (ref 7.8–10.44)
CHLORIDE SERPL-SCNC: 101 MMOL/L (ref 98–107)
CO2 SERPL-SCNC: 26 MMOL/L (ref 22–29)
CREAT CL PREDICTED SERPL C-G-VRATE: 67 ML/MIN (ref 70–130)
EOSINOPHIL # BLD AUTO: 0 THOU/UL (ref 0–0.7)
EOSINOPHIL NFR BLD AUTO: 0.1 % (ref 0–10)
GLUCOSE SERPL-MCNC: 186 MG/DL (ref 70–105)
HGB BLD-MCNC: 10.4 G/DL (ref 14–18)
LYMPHOCYTES # BLD: 1.1 THOU/UL (ref 1.2–3.4)
LYMPHOCYTES NFR BLD AUTO: 12.6 % (ref 21–51)
MCH RBC QN AUTO: 31.3 PG (ref 27–31)
MCV RBC AUTO: 93.4 FL (ref 78–98)
MONOCYTES # BLD AUTO: 0.7 THOU/UL (ref 0.11–0.59)
MONOCYTES NFR BLD AUTO: 8.2 % (ref 0–10)
NEUTROPHILS # BLD AUTO: 6.8 THOU/UL (ref 1.4–6.5)
NEUTROPHILS NFR BLD AUTO: 78.8 % (ref 42–75)
PLATELET # BLD AUTO: 176 THOU/UL (ref 130–400)
POTASSIUM SERPL-SCNC: 3.6 MMOL/L (ref 3.5–5.1)
RBC # BLD AUTO: 3.33 MILL/UL (ref 4.7–6.1)
SODIUM SERPL-SCNC: 140 MMOL/L (ref 136–145)
WBC # BLD AUTO: 8.6 THOU/UL (ref 4.8–10.8)

## 2020-02-27 RX ADMIN — Medication SCH ML: at 07:57

## 2020-02-27 RX ADMIN — INSULIN GLARGINE SCH MLS: 100 INJECTION, SOLUTION SUBCUTANEOUS at 08:50

## 2020-02-27 RX ADMIN — ONDANSETRON PRN MG: 2 INJECTION INTRAMUSCULAR; INTRAVENOUS at 10:25

## 2020-02-27 RX ADMIN — INSULIN HUMAN PRN UNIT: 100 INJECTION, SOLUTION PARENTERAL at 05:30

## 2020-02-27 RX ADMIN — Medication SCH: at 03:46

## 2020-02-27 RX ADMIN — INSULIN HUMAN PRN UNIT: 100 INJECTION, SOLUTION PARENTERAL at 12:51

## 2020-02-27 RX ADMIN — ONDANSETRON PRN MG: 2 INJECTION INTRAMUSCULAR; INTRAVENOUS at 00:04

## 2020-02-27 RX ADMIN — ASPIRIN 81 MG CHEWABLE TABLET SCH: 81 TABLET CHEWABLE at 13:41

## 2020-02-27 RX ADMIN — HEPARIN SODIUM SCH UNITS: 5000 INJECTION, SOLUTION INTRAVENOUS; SUBCUTANEOUS at 08:47

## 2020-02-27 NOTE — DIS
DATE OF ADMISSION:  02/25/2020



DATE OF DISCHARGE:  02/27/2020



DISCHARGE DIAGNOSES:  

1. DKA.

2. Hypertension, hyperlipidemia, gastroparesis due to type 2 diabetes mellitus.

3. Viral gastroenteritis, acute kidney injury.

4. Intractable nausea, vomiting.



DISCHARGE MEDICATIONS:  

1. Zofran 4 mg every 6 hours as needed for nausea and vomiting.

2. Aspirin 81 mg daily.

3. Lantus 36 units at nighttime.

4. Sliding scale lispro insulin.

5. Lisinopril 20 mg daily.

6. Losartan 25 mg daily.

7. Reglan 10 mg daily.

8. Toprol-XL 12.5 mg daily.



PHYSICAL EXAMINATION:

On the day of discharge; 

GENERAL:  The patient may still have some nausea.  His creatinine is improved. 

CARDIOVASCULAR:  Regular rate and rhythm without murmurs, rubs, or gallops. 

LUNGS:  Clear to auscultation bilaterally.  No wheezing, rales, or rhonchi. 

ABDOMEN:  Soft, nontender, nondistended.  Good bowel sounds.



HOSPITAL COURSE:  This is a 39-year-old male with a history of type 1 diabetes

mellitus, hypertension, gastroparesis, and below-knee amputation, admitted with

nausea, vomiting, diarrhea consistent with DKA.  The patient was started on DKA

protocol.  While the workup was still ongoing, he did receive aggressive IV

hydration and his creatinine improved from 4.0 to 2.32.  On the 27th, the patient

wants to go home.  He does not want to continue his medical management currently.

Despite him nauseated and still not taking any meals, he preferred to be at home and

take the nausea medications.  The patient is discharged, however, his workup is

incomplete and he has not started to have any p.o. intake yet. 



DISCHARGE INSTRUCTIONS:  

1. Activity as tolerated.

2. Diabetic diet.

3. Follow up with primary care physician within a week.

4. If he still have ongoing nausea, not able to have p.o. intake, consider coming

back to the ER. 



TIME SPENT:  Discharge time took over 30 minutes.







Job ID:  [id number]

## 2020-02-27 NOTE — PRG
DATE OF SERVICE:  02/27/2020



SUBJECTIVE:  A 39-year-old male being seen for acute kidney injury.  The patient

denied any nausea, vomiting, or chest pain. 



PHYSICAL EXAMINATION:

General:  The patient is awake and alert. 

VITAL SIGNS:  Afebrile, pulse 75, breathing at 16, and blood pressure 145/86. 

HEENT:  Head normocephalic and atraumatic.  Eyes intact, no ulcers.  Nose intact, no

ulcers.  Ears intact, no ulcers. 

NECK:  Supple.  No JVD. 

CHEST:  Symmetrical and clear. 

CARDIOVASCULAR:  Shows S1 and S2, no rub, no murmur. 

GASTROINTESTINAL:  Abdomen is soft, bowel sounds positive. 

EXTREMITIES:  Show no edema or ulcers. 

SKIN:  Shows no rash or petechiae. 

MUSCULOSKELETAL:  Shows no joint swelling or stiffness. 

GENITOURINARY:  Shows no Hebert or CVA tenderness. 

NEUROLOGIC:  Motor intact.  Cranial nerves intact.



LABORATORY DATA:  Reviewed.



ASSESSMENT AND PLAN:  

1. Acute kidney injury, improved.

2. Hypertension, stable.

3. Anemia, stable.

4. Chronic kidney disease stage 3, stable.  No indication for dialysis.  Continue

hydration. 







Job ID:  [id number]

## 2020-02-28 NOTE — PQF
HEIDI WORKMAN [employer]OUNDARI

[id number]                                                             [facility]

[id number]                             

                                   

CLINICAL DOCUMENTATION CLARIFICATION FORM:  POST DISCHARGE



Addendum to original discharge summary date:  __________________________________
____



Late entry note date:  _________________________________________________________
__











DATE: 2/28/2020                                        ATTN: Jocelynn Almazan



Please exercise your independent, professional judgment in responding to the 
clarification form. 

Clinical indicators are provided on the bottom of this form for your review



In your clinical opinion based on clinical findings below, can you please 
identify the condition as the reasonf for Inpatient admission if due to:



Please check appropriate box(s):

[  x] DKA

[  ] ANTONY

[  x] Viral Gastroenteritis

[  ] Other diagnosis ___________

[  ] Unable to determine

In addition, please specify:

Present on Admission (POA):  [  ] Yes             [  ] No             [  ] 
Unable to determine



For continuity of documentation, please document condition throughout progress 
notes and discharge summary.  Thank You.



CLINICAL INDICATORS - SIGNS / SYMPTOMS / LABS

Laboratory 2/25  Glucose 242; 139; 143; 174

Laboratory 2/25  Creatine 4.00; 3.87, BUN 60

H&P p1 2/25 Dr Mittal presented five days of nausea, vomiting and diarrhea

H&P p1 2/25 Dr Mittal  transferred to [facility] on 2/25/
2020 with diabetic ketoacidosis. Initially on presentation patients blood 
sugars for greater than 600 and he met criteria for diabetic ketoacidosis.

H&P p1 2/25 Dr Mittal  Patient tells me that he is at five days of nausea, 
vomiting, and diarrhea. Diarrhea has been watery without blood or black. 
Vomitus has been yellow/ green bilious without blood or black. Patient does 
state that he has been in contact with sick kids at home

H&P p1 2/25 Dr Mittal Patient diagnosed with acute kidney injury present on 
admission.

H&P p1 2/25 Dr Mittal Patient with viral gastroenteritis and diabetic 
ketoacidosis present on admission. 





RISK FACTORS

H&P p1 2/25  Type 1DM

H&P p1 2/25   HTN

H&P p1 2/25   Hyperlipidemia

H&P p1 2/25   Diabetic Gastroparesis

H&P p1 2/25   CKD





TREATMENTS:

MAR 2/25  Insulin 14 units drip

MAR 2/25  IV Reglan 10mg

MAR 2/25  IV Zofran 4mg

MAR 2/25  IVF NS Bolus 1L

Ordered a CT scan of the abdomen and pelvis 2/25

Ordered a Renal US 2/25

H&P p4 2/25  Symptomatic control for N/V

H&P p4 2/25  blood sugar control

H&P p4 2/25  replaced electrolytes as needed

H&P p4 2/25  IV fluid resuscitation

Nephrology consult 2/26  Rod Lindo











(This form is maintained as a part of the permanent medical record)

2015 [employer]  All Rights Reserved

[email or web address]    [phone]

                                                              



 

[email or web address]

## 2020-07-20 ENCOUNTER — HOSPITAL ENCOUNTER (INPATIENT)
Dept: HOSPITAL 92 - ERS | Age: 40
LOS: 2 days | Discharge: LEFT BEFORE DISCHARGE | DRG: 638 | End: 2020-07-22
Attending: FAMILY MEDICINE | Admitting: FAMILY MEDICINE
Payer: COMMERCIAL

## 2020-07-20 VITALS — BODY MASS INDEX: 25.4 KG/M2

## 2020-07-20 DIAGNOSIS — E11.319: ICD-10-CM

## 2020-07-20 DIAGNOSIS — E11.10: Primary | ICD-10-CM

## 2020-07-20 DIAGNOSIS — F17.210: ICD-10-CM

## 2020-07-20 DIAGNOSIS — H54.8: ICD-10-CM

## 2020-07-20 DIAGNOSIS — Z79.4: ICD-10-CM

## 2020-07-20 DIAGNOSIS — N18.3: ICD-10-CM

## 2020-07-20 DIAGNOSIS — Z89.512: ICD-10-CM

## 2020-07-20 DIAGNOSIS — Z79.82: ICD-10-CM

## 2020-07-20 DIAGNOSIS — K31.84: ICD-10-CM

## 2020-07-20 DIAGNOSIS — I12.9: ICD-10-CM

## 2020-07-20 DIAGNOSIS — Z79.899: ICD-10-CM

## 2020-07-20 DIAGNOSIS — N17.9: ICD-10-CM

## 2020-07-20 DIAGNOSIS — E11.22: ICD-10-CM

## 2020-07-20 DIAGNOSIS — E11.43: ICD-10-CM

## 2020-07-20 DIAGNOSIS — Z53.29: ICD-10-CM

## 2020-07-20 LAB
ALBUMIN SERPL BCG-MCNC: 4.2 G/DL (ref 3.5–5)
ALP SERPL-CCNC: 86 U/L (ref 40–110)
ALT SERPL W P-5'-P-CCNC: 18 U/L (ref 8–55)
ANALYZER IN CARDIO: (no result)
ANION GAP SERPL CALC-SCNC: 31 MMOL/L (ref 10–20)
AST SERPL-CCNC: 23 U/L (ref 5–34)
BASE EXCESS STD BLDA CALC-SCNC: -5.2 MEQ/L
BASOPHILS # BLD AUTO: 0 THOU/UL (ref 0–0.2)
BASOPHILS NFR BLD AUTO: 0.1 % (ref 0–1)
BILIRUB SERPL-MCNC: 0.5 MG/DL (ref 0.2–1.2)
BUN SERPL-MCNC: 70 MG/DL (ref 8.9–20.6)
CA-I BLDA-SCNC: 1.13 MMOL/L (ref 1.12–1.3)
CALCIUM SERPL-MCNC: 9.9 MG/DL (ref 7.8–10.44)
CHLORIDE SERPL-SCNC: 97 MMOL/L (ref 98–107)
CO2 SERPL-SCNC: 15 MMOL/L (ref 22–29)
CREAT CL PREDICTED SERPL C-G-VRATE: 0 ML/MIN (ref 70–130)
EOSINOPHIL # BLD AUTO: 0 THOU/UL (ref 0–0.7)
EOSINOPHIL NFR BLD AUTO: 0.1 % (ref 0–10)
GLOBULIN SER CALC-MCNC: 3.5 G/DL (ref 2.4–3.5)
GLUCOSE SERPL-MCNC: 439 MG/DL (ref 70–105)
HCO3 BLDA-SCNC: 15.4 MEQ/L (ref 22–28)
HCT VFR BLDA CALC: 36 % (ref 42–52)
HGB BLD-MCNC: 11.6 G/DL (ref 14–18)
HGB BLDA-MCNC: 12.1 G/DL (ref 14–18)
LYMPHOCYTES # BLD: 1.1 THOU/UL (ref 1.2–3.4)
LYMPHOCYTES NFR BLD AUTO: 10 % (ref 21–51)
MCH RBC QN AUTO: 30.7 PG (ref 27–31)
MCV RBC AUTO: 91.7 FL (ref 78–98)
MONOCYTES # BLD AUTO: 0.4 THOU/UL (ref 0.11–0.59)
MONOCYTES NFR BLD AUTO: 3.9 % (ref 0–10)
NEUTROPHILS # BLD AUTO: 9 THOU/UL (ref 1.4–6.5)
NEUTROPHILS NFR BLD AUTO: 85.9 % (ref 42–75)
PCO2 BLDA: 19 MMHG (ref 35–45)
PH BLDA: 7.53 [PH] (ref 7.35–7.45)
PLATELET # BLD AUTO: 229 THOU/UL (ref 130–400)
PO2 BLDA: 107.7 MMHG (ref 80–100)
POTASSIUM BLD-SCNC: 4.15 MMOL/L (ref 3.7–5.3)
POTASSIUM SERPL-SCNC: 4.5 MMOL/L (ref 3.5–5.1)
RBC # BLD AUTO: 3.78 MILL/UL (ref 4.7–6.1)
SODIUM SERPL-SCNC: 138 MMOL/L (ref 136–145)
SPECIMEN DRAWN FROM PATIENT: (no result)
WBC # BLD AUTO: 10.5 THOU/UL (ref 4.8–10.8)

## 2020-07-20 PROCEDURE — S0028 INJECTION, FAMOTIDINE, 20 MG: HCPCS

## 2020-07-20 PROCEDURE — 80048 BASIC METABOLIC PNL TOTAL CA: CPT

## 2020-07-20 PROCEDURE — 96375 TX/PRO/DX INJ NEW DRUG ADDON: CPT

## 2020-07-20 PROCEDURE — 36416 COLLJ CAPILLARY BLOOD SPEC: CPT

## 2020-07-20 PROCEDURE — 96365 THER/PROPH/DIAG IV INF INIT: CPT

## 2020-07-20 PROCEDURE — 96361 HYDRATE IV INFUSION ADD-ON: CPT

## 2020-07-20 PROCEDURE — 36415 COLL VENOUS BLD VENIPUNCTURE: CPT

## 2020-07-20 PROCEDURE — 85025 COMPLETE CBC W/AUTO DIFF WBC: CPT

## 2020-07-20 PROCEDURE — 82805 BLOOD GASES W/O2 SATURATION: CPT

## 2020-07-20 PROCEDURE — 96376 TX/PRO/DX INJ SAME DRUG ADON: CPT

## 2020-07-20 PROCEDURE — 85027 COMPLETE CBC AUTOMATED: CPT

## 2020-07-20 PROCEDURE — 82010 KETONE BODYS QUAN: CPT

## 2020-07-20 PROCEDURE — 80053 COMPREHEN METABOLIC PANEL: CPT

## 2020-07-21 LAB
ANION GAP SERPL CALC-SCNC: 14 MMOL/L (ref 10–20)
ANION GAP SERPL CALC-SCNC: 18 MMOL/L (ref 10–20)
ANION GAP SERPL CALC-SCNC: 19 MMOL/L (ref 10–20)
BUN SERPL-MCNC: 61 MG/DL (ref 8.9–20.6)
BUN SERPL-MCNC: 65 MG/DL (ref 8.9–20.6)
BUN SERPL-MCNC: 70 MG/DL (ref 8.9–20.6)
CALCIUM SERPL-MCNC: 8.2 MG/DL (ref 7.8–10.44)
CALCIUM SERPL-MCNC: 8.4 MG/DL (ref 7.8–10.44)
CALCIUM SERPL-MCNC: 8.6 MG/DL (ref 7.8–10.44)
CHLORIDE SERPL-SCNC: 104 MMOL/L (ref 98–107)
CHLORIDE SERPL-SCNC: 109 MMOL/L (ref 98–107)
CHLORIDE SERPL-SCNC: 111 MMOL/L (ref 98–107)
CO2 SERPL-SCNC: 20 MMOL/L (ref 22–29)
CO2 SERPL-SCNC: 22 MMOL/L (ref 22–29)
CO2 SERPL-SCNC: 23 MMOL/L (ref 22–29)
CREAT CL PREDICTED SERPL C-G-VRATE: 44 ML/MIN (ref 70–130)
CREAT CL PREDICTED SERPL C-G-VRATE: 46 ML/MIN (ref 70–130)
CREAT CL PREDICTED SERPL C-G-VRATE: 54 ML/MIN (ref 70–130)
GLUCOSE SERPL-MCNC: 124 MG/DL (ref 70–105)
GLUCOSE SERPL-MCNC: 233 MG/DL (ref 70–105)
GLUCOSE SERPL-MCNC: 89 MG/DL (ref 70–105)
HGB BLD-MCNC: 10.5 G/DL (ref 14–18)
MCH RBC QN AUTO: 31.3 PG (ref 27–31)
MCV RBC AUTO: 91.6 FL (ref 78–98)
PLATELET # BLD AUTO: 198 THOU/UL (ref 130–400)
POTASSIUM SERPL-SCNC: 3.8 MMOL/L (ref 3.5–5.1)
POTASSIUM SERPL-SCNC: 3.8 MMOL/L (ref 3.5–5.1)
POTASSIUM SERPL-SCNC: 3.9 MMOL/L (ref 3.5–5.1)
RBC # BLD AUTO: 3.35 MILL/UL (ref 4.7–6.1)
SODIUM SERPL-SCNC: 141 MMOL/L (ref 136–145)
SODIUM SERPL-SCNC: 143 MMOL/L (ref 136–145)
SODIUM SERPL-SCNC: 144 MMOL/L (ref 136–145)
WBC # BLD AUTO: 10.1 THOU/UL (ref 4.8–10.8)

## 2020-07-21 RX ADMIN — Medication SCH MG: at 14:35

## 2020-07-21 RX ADMIN — Medication SCH MG: at 20:43

## 2020-07-21 RX ADMIN — INSULIN GLARGINE SCH MLS: 100 INJECTION, SOLUTION SUBCUTANEOUS at 08:43

## 2020-07-21 RX ADMIN — FAMOTIDINE SCH MG: 10 INJECTION, SOLUTION INTRAVENOUS at 08:47

## 2020-07-21 RX ADMIN — INSULIN LISPRO SCH UNIT: 100 INJECTION, SOLUTION INTRAVENOUS; SUBCUTANEOUS at 08:43

## 2020-07-21 RX ADMIN — ASPIRIN 81 MG CHEWABLE TABLET SCH: 81 TABLET CHEWABLE at 08:46

## 2020-07-21 RX ADMIN — INSULIN LISPRO SCH: 100 INJECTION, SOLUTION INTRAVENOUS; SUBCUTANEOUS at 14:31

## 2020-07-21 RX ADMIN — INSULIN LISPRO SCH UNIT: 100 INJECTION, SOLUTION INTRAVENOUS; SUBCUTANEOUS at 17:11

## 2020-07-21 NOTE — HP
PRIMARY CARE PROVIDER:  Lisa Bowling.



CHIEF COMPLAINT:  Nausea and vomiting.



HISTORY OF PRESENT ILLNESS:  This is a 39-year-old  male who presents to

[facility] Emergency Department complaining of protracted nausea and

vomiting over the last 2 to 3 days.  The patient with a significant history of

diabetes mellitus type 2, insulin requiring with recent admission in February 2020

for diabetic ketoacidosis.  The patient states he has been unable to eat anything

since three days prior to this evaluation and has been attempting to maintain water

intake.  The patient admits to feeling generally weak without unilateral weakness.

The patient denied any fever, chills, cough, or shortness of breath.  The patient

denied any diarrhea or dysuria.  The patient states he has been compliant with his

diabetic regimen including insulin dosing.  In the emergency room, the patient

underwent general evaluation and was discovered in DKA, initiated on insulin

infusion in addition to receiving intravenous normal saline x2 L and antiemetics

with Zofran. 



PAST MEDICAL HISTORY:  

1. Diabetes mellitus type 2, insulin requiring with diabetic nephropathy.

2. Diabetic retinopathy.

3. Hypertension.

4. Legal blindness.

5. Diabetes mellitus, type 1.



PAST SURGICAL HISTORY:  

1. Status post left below-the-knee amputation.

2. Status post bilateral eye surgery due to diabetic retinopathy.



CURRENT MEDICATIONS:  

1. Aspirin 81 mg p.o. daily.

2. Humalog 50/50 mix sliding scale.

3. Lantus 36 units subcutaneously daily.

4. Lisinopril 20 mg p.o. daily.

5. Metoprolol tartrate 12.5 mg p.o. b.i.d.

6. Lovastatin 20 mg p.o. daily.

7. Metoclopramide 10 mg p.o. daily.



ALLERGIES:  NO KNOWN DRUG ALLERGIES.



FAMILY HISTORY:  Positive for diabetes mellitus.



SOCIAL HISTORY:  Resides in [place], [place].  Disabled.  Smokes E-cigarettes.  No

alcohol or illicit drug use.  Ambulates with use of a cane or walker due to a left

below-the-knee amputation. 



REVIEW OF SYSTEMS:  CONSTITUTIONAL:  Negative for weight loss or gain, ability to

conduct usual activities. 

SKIN:  Negative for rash, itching. 

EYES:  Negative for double vision, pain. 

ENT/MOUTH:  Negative for nose bleeding, neck stiffness, pain, tenderness. 

CARDIOVASCULAR:  Negative for palpitations, dyspnea on exertion, orthopnea. 

RESPIRATORY:  Negative for shortness of breath, wheezing, cough, hemoptysis, fever

or night sweats. 

GASTROINTESTINAL:  Negative for poor appetite, abdominal pain, heartburn, nausea,

vomiting, constipation, or diarrhea. 

GENITOURINARY:  Negative for urgency, frequency, dysuria, nocturia. 

MUSCULOSKELETAL:  Negative for pain, swelling. 

NEUROLOGIC/PSYCHIATRIC:  Negative for anxiety, depression. 

ALLERGY/IMMUNOLOGIC:  Negative for skin rash, bleeding tendency. 



Otherwise, negative except as stated per HPI.



PHYSICAL EXAMINATION:

VITAL SIGNS:  On admission, blood pressure 174/90, pulse 106, respiratory rate 22,

temperature 97.5 degrees Fahrenheit, and O2 saturation 100% on room air. 

GENERAL APPEARANCE:  This is a 39-year-old  male, ill appearing, lethargic

in the Critical Care Unit. 

HEENT:  Pupils are equal, round, reactive to light and accommodation.  Extraocular

muscles are intact.  No scleral icterus.  No conjunctival injection.  Nares patent.

OP is clear.  Oral mucosa dry. 

NECK:  Supple.  No cervical adenopathy.  No thyromegaly.  No carotid bruits.  No JVD

appreciated.  Cervical spine with full active and passive range of motion.  No

meningeal signs noted. 

CHEST:  Diminished breath sounds in the bases bilaterally.  Occasional rhonchi

noted. 

CARDIOVASCULAR EXAM:  S1 and S2 with tachycardia. 

ABDOMEN:  Rounded and soft with mild tenderness to palpation diffusely.  Bowel

sounds are positive in all 4 quadrants.  No palpable mass.  No rebound or guarding

noted. 

EXTREMITIES:  Warm and dry with poor skin turgor.  Left below-the-knee amputation

noted.  Pulses palpable of the right lower extremity at the dorsalis pedis and

posterior tibial arteries not bilaterally. 

NEUROLOGIC:  Cranial nerves 2 through 12 are grossly intact.  No focal or

lateralizing signs appreciated. 



PERTINENT LABORATORY AND X-RAY FINDINGS:  Sodium 138, potassium 4.5, chloride 97,

CO2 of 15, BUN 70, creatinine 3.84, estimated GFR of 17, glucose 439, calcium 9.9.

LFTs within normal limits.  CBC showed a white blood cell count of 10.5, hemoglobin

12, hematocrit 35, platelet count 229 with 86% neutrophils.  ABG dated 07/20/2020,

showed arterial specimen, pH 7.53, bicarb 15.4, pCO2 of 19, pO2 of 108, and O2

saturation 98% on 21% FiO2.  Beta-hydroxybutyrate level 6.85.  Telemetry monitoring

shows sinus tachycardia with heart rates in the low 110s. 



ASSESSMENT AND PLAN:  

1. Diabetic ketoacidosis.  The patient will be admitted to the Critical Care Unit.

We will continue insulin infusion per protocol.  Serial Accu-Cheks q.1 hour.  No

focal occult infection identified currently.  Repeat beta-hydroxybutyrate level in

the a.m. 

2. Acute on chronic kidney disease.  Avoid nephrotoxic agents and limit contrast

exposure.  Serial creatinine in the a.m. 

3. Nausea and vomiting with diabetic gastroparesis.  Continue Phenergan 25 mg IV q.6

hours.  Consider resumption of Reglan. 

4. Hypertension.  Resume home blood pressure regimen and monitor clinical response.

Hold lisinopril times 24 to 48 hours. 

5. Prophylaxis.  Sequential compression devices while in bed.  Pepcid 20 mg IV q.12

hours. 

6. Code status, full.  Surrogate medical decision maker is the patient's mother.







Job ID:  [id number]

## 2020-07-21 NOTE — CON
DATE OF CONSULTATION:  



HISTORY OF PRESENT ILLNESS:  Mando Kaur is a 39-year-old gentleman, 170 kg, came to

the ER last night with nausea and vomiting.  He has diabetes. 



His blood pressure was 182/87, temperature was 97, saturations are 100% on room air.

 He is now in the ICU, on a DKA protocol.  Pulmonary is seeing while he is in the

ICU. 



He offers no complaint.  No shortness of breath, coughing, or wheezing.



PAST MEDICAL HISTORY:  Diabetes.



PAST SURGICAL HISTORY:  Left __________ eye surgery.



SOCIAL HISTORY:  Smokes a pack a day.  No alcohol abuse.



HOME MEDICATIONS:  

1. Aspirin.

2. Insulin Lantus 36 units daily.

3. Lisinopril 20.

4. Metoprolol 25.

5. Reglan 10.

6. Lovastatin.



ALLERGIES:  NONE.



REVIEW OF SYSTEMS:  Otherwise, negative.



PHYSICAL EXAMINATION:

VITAL SIGNS:  Stable.  Saturations 100%, blood pressure 137/73, pulse 94,

respirations 18, afebrile, temperature 99. 

CHEST:  No wheezing.  No crackles. 

CARDIAC:  Normal S1 and S2.  No gallops. 

ABDOMEN:  No masses.



LABORATORY DATA:  White count 10,000, hemoglobin and hematocrit are 10 and 30,

platelet count is 198. PO2 not done.  BUN and creatinine __________ glucose 119. 



ASSESSMENT AND PLAN:  Uncontrolled diabetes, renal failure, nausea, vomiting. 



Pulmonary will follow while in the ICU.  I agree with present treatment, hydration,

antinausea medication. 



Restart home diabetic medication. 



Consultation note 70 minutes, 50% direct patient care.







Job ID:  [id number]

## 2020-07-22 VITALS — TEMPERATURE: 98.4 F | DIASTOLIC BLOOD PRESSURE: 91 MMHG | SYSTOLIC BLOOD PRESSURE: 174 MMHG

## 2020-07-22 LAB
ANION GAP SERPL CALC-SCNC: 14 MMOL/L (ref 10–20)
BUN SERPL-MCNC: 42 MG/DL (ref 8.9–20.6)
CALCIUM SERPL-MCNC: 7.7 MG/DL (ref 7.8–10.44)
CHLORIDE SERPL-SCNC: 107 MMOL/L (ref 98–107)
CO2 SERPL-SCNC: 23 MMOL/L (ref 22–29)
CREAT CL PREDICTED SERPL C-G-VRATE: 74 ML/MIN (ref 70–130)
GLUCOSE SERPL-MCNC: 125 MG/DL (ref 70–105)
POTASSIUM SERPL-SCNC: 3.7 MMOL/L (ref 3.5–5.1)
SODIUM SERPL-SCNC: 140 MMOL/L (ref 136–145)

## 2020-07-22 RX ADMIN — FAMOTIDINE SCH MG: 10 INJECTION, SOLUTION INTRAVENOUS at 08:59

## 2020-07-22 RX ADMIN — ASPIRIN 81 MG CHEWABLE TABLET SCH: 81 TABLET CHEWABLE at 09:03

## 2020-07-22 RX ADMIN — Medication SCH: at 09:03

## 2020-07-22 RX ADMIN — Medication SCH MG: at 15:30

## 2020-07-22 RX ADMIN — INSULIN GLARGINE SCH MLS: 100 INJECTION, SOLUTION SUBCUTANEOUS at 09:00

## 2020-07-22 NOTE — DIS
DATE OF ADMISSION:  07/21/2020



DATE OF DISCHARGE:  07/22/2020



HOSPITAL COURSE:  Mr. Kaur is a 39-year-old male with a medical history of type 2

diabetes, diabetic nephropathy, diabetic retinopathy, hypertension, and legal

blindness, who presented with inability to eat or drink for 3 days because of nausea

and vomiting.  He was diagnosed with DKA.  He was initially admitted to the ICU.

His anion gap was resolved within the few hours; however, nausea and vomiting

persisted.  The patient was administered multiple antiemetics; however, refuse to

take most of them with the exception of promethazine and a few doses of Zofran.  He

remained nauseated and could not tolerate liquids.  Upon attempt to modify his

medications, the patient decided to leave [place].  At the time of leaving, the patient

was hemodynamically stable with the exception of nausea and vomiting, had no

complaints.  In addition, the patient was diagnosed with ANTONY, on CKD.  The patient

was supplemented with IV fluids and apparently returned to baseline function, which

is CKD 3B.  Prior to discharge, the patient was requested to follow up with his

primary care physician within three days. 



PHYSICAL EXAMINATION:

VITAL SIGNS:  Blood pressure 174/91, pulse 86, respiratory rate 20, and oxygen

saturation 100% on room air, and temperature 98.4. 

GENERAL:  Lying in bed and appears in mild distress due to nausea. 

HEART:  Regular rhythm, tachycardic.  No murmurs, gallops, or rubs.  Normal

peripheral pulses. 

RESPIRATORY:  Clear to auscultation bilaterally.  No wheezing, rales, or rhonchi. 

GI:  Soft, nontender, and nondistended.  Normal bowel sounds.  No guarding or

rigidity. 



MEDICATION LIST:  New medications;

1. Chlorthalidone 25 mg p.o. daily.

2. Pyridoxine 25 mg p.o. t.i.d. p.r.n. nausea and vomiting.

3. Metoclopramide 10 mg p.o. q.6 hours p.r.n. nausea and vomiting.

4. Zofran 4 mg p.o. q.6 hours p.r.n. nausea and vomiting. 



Continued medications;

1. Aspirin.

2. Metoprolol succinate.

3. Insulin glargine.

4. Insulin lispro.

5. Lisinopril.

6. Lovastatin. 



Modified medications, no modified medications. 



Discontinued medications, no discontinued medications.







Job ID:  [id number]

## 2020-07-22 NOTE — PDOC.HOSPP
- Subjective


Encounter Date: 07/22/20


Encounter Time: 09:50


Subjective: 


No overnight events. This morning, appears in moderate distress because of 

persistent nausea. No other complaints.








- Objective


Vital Signs & Weight: 


 Vital Signs (12 hours)











  Temp Pulse Resp BP BP Pulse Ox


 


 07/22/20 04:13  98.7 F  95  18   177/89 H  98


 


 07/22/20 00:26  98.7 F  83  19   152/78 H  97


 


 07/21/20 21:20     177/84 H  


 


 07/21/20 20:00  98.8 F  102 H  18   177/84 H  99








 Weight











Admit Weight                   238 lb


 


Weight                         238 lb 1.588 oz











 Most Recent Monitor Data











Heart Rate from ECG            107


 


NIBP                           196/88


 


NIBP BP-Mean                   124


 


Respiration from ECG           17


 


SpO2                           100














I&O: 


 











 07/21/20 07/22/20 07/23/20





 06:59 06:59 06:59


 


Intake Total 2269 4351.5 


 


Output Total 1430 2800 


 


Balance 839 1551.5 











Result Diagrams: 


 07/21/20 03:18





 07/22/20 05:30


Additional Labs: 


 Accuchecks











  07/21/20 07/21/20 07/21/20





  12:33 09:57 08:31


 


POC Glucose  84  107  119 H














  07/21/20 07/21/20 07/21/20





  07:36 06:40 05:43


 


POC Glucose  118 H  138 H  174 H














  07/21/20 07/21/20 07/21/20





  04:13 02:57 01:37


 


POC Glucose  195 H  230 H  288 H














  07/21/20 07/20/20





  01:12 22:28


 


POC Glucose  285 H  378 H














Hospitalist ROS





- Review of Systems


Constitutional: reports: sweats, weakness.  denies: fever, chills


Eyes: denies: vision change


Respiratory: denies: cough, shortness of breath, hemoptysis, SOB with excertion


Cardiovascular: denies: chest pain


Gastrointestinal: reports: nausea, constipation.  denies: abdominal pain, 

diarrhea





- Medication


Medications: 


Active Medications











Generic Name Dose Route Start Last Admin





  Trade Name Freq  PRN Reason Stop Dose Admin


 


Aspirin  81 mg  07/21/20 09:00  07/21/20 08:46





  Aspirin Chewable  PO   Not Given





  DAILY [facility]   





     





     





     





     


 


Famotidine  20 mg  07/21/20 09:00  07/21/20 08:47





  Pepcid  SLOW IVP   20 mg





  DAILY PASQUALE   Administration





     





     





     





     


 


Hydralazine HCl  10 mg  07/21/20 02:59  07/21/20 17:45





  Apresoline  SLOW IVP   10 mg





  Q4H PRN   Administration





  SBP > 180 and HR < 70   





     





     





     


 


Promethazine HCl 25 mg/ Sodium  51 mls @ 204 mls/hr  07/21/20 02:59  07/22/20 02

:22





  Chloride  IVPB   51 mls





  Q6H PRN   Administration





  Nausea/Vomiting   





     





     





     


 


Insulin Glargine 20 units/  0.2 mls @ 0 mls/hr  07/21/20 09:00  07/21/20 08:43





  Miscellaneous Medication  SC   0.2 mls





  QAM PASQUALE   Administration





     





     





     





     


 


Sodium Chloride  1,000 mls @ 100 mls/hr  07/21/20 15:00  07/22/20 01:05





  Normal Saline 0.9%  IV   Not Given





  .Q10H PASQUALE   





     





     





     





     


 


Insulin Human Lispro  7 units  07/21/20 08:00  07/21/20 17:11





  Humalog  SC   7 unit





  TID-WM PASQUALE   Administration





     





     





     





     


 


Labetalol HCl  20 mg  07/21/20 02:59  07/21/20 11:00





  Normodyne  SLOW IVP   20 mg





  Q4H PRN   Administration





  SBP > 180 and HR >/= 70   





     





     





     


 


Metoclopramide HCl  10 mg  07/21/20 21:00  07/21/20 20:43





  Reglan  PO   10 mg





  HS PASQUALE   Administration





     





     





     





     


 


Metoprolol Succinate  12.5 mg  07/21/20 09:00  07/21/20 10:51





  Toprol Xl  PO   12.5 mg





  DAILY PASQUALE   Administration





     





     





     





     


 


Ondansetron HCl  4 mg  07/21/20 16:41  07/22/20 01:38





  Zofran Odt  PO   4 mg





  Q6H PRN   Administration





  Nausea/Vomiting   





     





     





     


 


Pyridoxine HCl  25 mg  07/21/20 15:00  07/21/20 20:43





  Vitamin B 6  PO   25 mg





  TID PASQUALE   Administration





     





     





     





     














- Exam


General - other findings: moderate distress due to nausea


Heart: no murmur, no gallops, no rubs, normal peripheral pulses


Heart - other findings: regular rhythm, tachycardic


Respiratory: CTAB, no wheezes, no rales, normal chest expansion


Gastrointestinal: soft, non-tender, non-distended, normal bowel sounds, no 

palpable masses, no guarding, no rigidity





Hosp A/P


(1) ANTONY (acute kidney injury)


Code(s): N17.9 - ACUTE KIDNEY FAILURE, UNSPECIFIED   Status: Acute   





(2) DKA (diabetic ketoacidoses)


Code(s): E11.10 - TYPE 2 DIABETES MELLITUS WITH KETOACIDOSIS WITHOUT COMA   

Status: Acute   





(3) HTN (hypertension)


Code(s): I10 - ESSENTIAL (PRIMARY) HYPERTENSION   Status: Acute   





- Plan


Diabetic ketoacidosis (resolved)


currently on subq insulin regimen


BG glucose well controlled; however persistent nausea





-








Acute CKD


Avoid nephrotoxic agents and limit contrast exposure. Serial creatinine in the 

a.m.





Nausea and vomiting with diabetic gastroparesis


Continue Phenergan 25 mg IV q.6 hours. Consider resumption of Reglan





Hypertension


Resume home blood pressure regimen and monitor clinical response. Hold 

lisinopril times 24 to 48 hours. 





Prophylaxis


Sequential compression devices while in bed. Pepcid 20 mg IV q. 12 hours.





Code status, full


Surrogate medical decision maker is the patient's mother.

## 2021-01-16 ENCOUNTER — HOSPITAL ENCOUNTER (INPATIENT)
Dept: HOSPITAL 92 - ERS | Age: 41
LOS: 1 days | Discharge: LEFT BEFORE DISCHARGE | DRG: 74 | End: 2021-01-17
Attending: INTERNAL MEDICINE | Admitting: INTERNAL MEDICINE
Payer: COMMERCIAL

## 2021-01-16 VITALS — BODY MASS INDEX: 24.9 KG/M2

## 2021-01-16 DIAGNOSIS — Z89.512: ICD-10-CM

## 2021-01-16 DIAGNOSIS — Z28.21: ICD-10-CM

## 2021-01-16 DIAGNOSIS — E78.5: ICD-10-CM

## 2021-01-16 DIAGNOSIS — Z79.4: ICD-10-CM

## 2021-01-16 DIAGNOSIS — D72.829: ICD-10-CM

## 2021-01-16 DIAGNOSIS — N18.4: ICD-10-CM

## 2021-01-16 DIAGNOSIS — E11.22: ICD-10-CM

## 2021-01-16 DIAGNOSIS — K31.84: ICD-10-CM

## 2021-01-16 DIAGNOSIS — Z79.82: ICD-10-CM

## 2021-01-16 DIAGNOSIS — Z79.899: ICD-10-CM

## 2021-01-16 DIAGNOSIS — H54.8: ICD-10-CM

## 2021-01-16 DIAGNOSIS — E11.10: ICD-10-CM

## 2021-01-16 DIAGNOSIS — I12.9: ICD-10-CM

## 2021-01-16 DIAGNOSIS — Z20.822: ICD-10-CM

## 2021-01-16 DIAGNOSIS — E11.43: Primary | ICD-10-CM

## 2021-01-16 DIAGNOSIS — F17.290: ICD-10-CM

## 2021-01-16 DIAGNOSIS — E11.319: ICD-10-CM

## 2021-01-16 LAB
ALBUMIN SERPL BCG-MCNC: 4.3 G/DL (ref 3.5–5)
ALP SERPL-CCNC: 80 U/L (ref 40–110)
ALT SERPL W P-5'-P-CCNC: 17 U/L (ref 8–55)
ANION GAP SERPL CALC-SCNC: 17 MMOL/L (ref 10–20)
ANION GAP SERPL CALC-SCNC: 19 MMOL/L (ref 10–20)
ANION GAP SERPL CALC-SCNC: 28 MMOL/L (ref 10–20)
AST SERPL-CCNC: 23 U/L (ref 5–34)
BASOPHILS # BLD AUTO: 0 THOU/UL (ref 0–0.2)
BASOPHILS NFR BLD AUTO: 0.2 % (ref 0–1)
BILIRUB SERPL-MCNC: 0.6 MG/DL (ref 0.2–1.2)
BUN SERPL-MCNC: 48 MG/DL (ref 8.9–20.6)
BUN SERPL-MCNC: 51 MG/DL (ref 8.9–20.6)
BUN SERPL-MCNC: 51 MG/DL (ref 8.9–20.6)
CALCIUM SERPL-MCNC: 8.2 MG/DL (ref 7.8–10.44)
CALCIUM SERPL-MCNC: 8.6 MG/DL (ref 7.8–10.44)
CALCIUM SERPL-MCNC: 9.3 MG/DL (ref 7.8–10.44)
CHLORIDE SERPL-SCNC: 105 MMOL/L (ref 98–107)
CHLORIDE SERPL-SCNC: 93 MMOL/L (ref 98–107)
CHLORIDE SERPL-SCNC: 99 MMOL/L (ref 98–107)
CK MB SERPL-MCNC: 1.7 NG/ML (ref 0–6.6)
CO2 SERPL-SCNC: 20 MMOL/L (ref 22–29)
CO2 SERPL-SCNC: 25 MMOL/L (ref 22–29)
CO2 SERPL-SCNC: 25 MMOL/L (ref 22–29)
CREAT CL PREDICTED SERPL C-G-VRATE: 0 ML/MIN (ref 70–130)
CREAT CL PREDICTED SERPL C-G-VRATE: 0 ML/MIN (ref 70–130)
CREAT CL PREDICTED SERPL C-G-VRATE: 52 ML/MIN (ref 70–130)
EOSINOPHIL # BLD AUTO: 0 THOU/UL (ref 0–0.7)
EOSINOPHIL NFR BLD AUTO: 0.1 % (ref 0–10)
GLOBULIN SER CALC-MCNC: 3 G/DL (ref 2.4–3.5)
GLUCOSE SERPL-MCNC: 118 MG/DL (ref 70–105)
GLUCOSE SERPL-MCNC: 280 MG/DL (ref 70–105)
GLUCOSE SERPL-MCNC: 373 MG/DL (ref 70–105)
HGB BLD-MCNC: 11.4 G/DL (ref 14–18)
LYMPHOCYTES # BLD: 1.3 THOU/UL (ref 1.2–3.4)
LYMPHOCYTES NFR BLD AUTO: 9 % (ref 21–51)
MCH RBC QN AUTO: 30.9 PG (ref 27–31)
MCV RBC AUTO: 92.4 FL (ref 78–98)
MONOCYTES # BLD AUTO: 0.5 THOU/UL (ref 0.11–0.59)
MONOCYTES NFR BLD AUTO: 3.8 % (ref 0–10)
NEUTROPHILS # BLD AUTO: 12.3 THOU/UL (ref 1.4–6.5)
NEUTROPHILS NFR BLD AUTO: 87 % (ref 42–75)
PLATELET # BLD AUTO: 184 THOU/UL (ref 130–400)
POTASSIUM SERPL-SCNC: 3.8 MMOL/L (ref 3.5–5.1)
POTASSIUM SERPL-SCNC: 3.8 MMOL/L (ref 3.5–5.1)
POTASSIUM SERPL-SCNC: 4.6 MMOL/L (ref 3.5–5.1)
RBC # BLD AUTO: 3.7 MILL/UL (ref 4.7–6.1)
SODIUM SERPL-SCNC: 136 MMOL/L (ref 136–145)
SODIUM SERPL-SCNC: 139 MMOL/L (ref 136–145)
SODIUM SERPL-SCNC: 143 MMOL/L (ref 136–145)
WBC # BLD AUTO: 14.1 THOU/UL (ref 4.8–10.8)

## 2021-01-16 PROCEDURE — 96367 TX/PROPH/DG ADDL SEQ IV INF: CPT

## 2021-01-16 PROCEDURE — 96366 THER/PROPH/DIAG IV INF ADDON: CPT

## 2021-01-16 PROCEDURE — 82553 CREATINE MB FRACTION: CPT

## 2021-01-16 PROCEDURE — 71045 X-RAY EXAM CHEST 1 VIEW: CPT

## 2021-01-16 PROCEDURE — 84484 ASSAY OF TROPONIN QUANT: CPT

## 2021-01-16 PROCEDURE — 36416 COLLJ CAPILLARY BLOOD SPEC: CPT

## 2021-01-16 PROCEDURE — 96376 TX/PRO/DX INJ SAME DRUG ADON: CPT

## 2021-01-16 PROCEDURE — 80053 COMPREHEN METABOLIC PANEL: CPT

## 2021-01-16 PROCEDURE — 96368 THER/DIAG CONCURRENT INF: CPT

## 2021-01-16 PROCEDURE — 82010 KETONE BODYS QUAN: CPT

## 2021-01-16 PROCEDURE — 93005 ELECTROCARDIOGRAM TRACING: CPT

## 2021-01-16 PROCEDURE — 85025 COMPLETE CBC W/AUTO DIFF WBC: CPT

## 2021-01-16 PROCEDURE — 96365 THER/PROPH/DIAG IV INF INIT: CPT

## 2021-01-16 PROCEDURE — 36415 COLL VENOUS BLD VENIPUNCTURE: CPT

## 2021-01-16 RX ADMIN — ONDANSETRON PRN MG: 2 INJECTION INTRAMUSCULAR; INTRAVENOUS at 19:49

## 2021-01-16 RX ADMIN — HEPARIN SODIUM SCH UNITS: 5000 INJECTION, SOLUTION INTRAVENOUS; SUBCUTANEOUS at 19:50

## 2021-01-16 RX ADMIN — POTASSIUM CHLORIDE, DEXTROSE MONOHYDRATE AND SODIUM CHLORIDE PRN MLS: 150; 5; 450 INJECTION, SOLUTION INTRAVENOUS at 21:58

## 2021-01-16 RX ADMIN — POTASSIUM CHLORIDE, DEXTROSE MONOHYDRATE AND SODIUM CHLORIDE PRN MLS: 150; 5; 450 INJECTION, SOLUTION INTRAVENOUS at 23:55

## 2021-01-16 RX ADMIN — POTASSIUM CHLORIDE AND SODIUM CHLORIDE PRN MLS: 900; 150 INJECTION, SOLUTION INTRAVENOUS at 22:02

## 2021-01-16 RX ADMIN — POTASSIUM CHLORIDE AND SODIUM CHLORIDE PRN MLS: 900; 150 INJECTION, SOLUTION INTRAVENOUS at 19:50

## 2021-01-16 NOTE — PDOC.HHP
Hospitalist HPI





- History of Present Illness


Nausea vomiting


History of Present Illness: 





Patient is a 40-year-old male with a history of recurrent diabetic ketoacidosis 

in admissions.  Typically presents with nausea vomiting.  Patient has some 

gastroparesis which unfortunately exacerbates his GI symptoms.  Patient 

presented to the emergency department today with 2 to 3 days of nausea and 

vomiting.  He says he always has chills but denies any specific fever.  Denies 

any other symptoms of specific underlying infection.  Says he has been trying to

take his medications.


ED Course: 





Patient had elevated beta hydroxybutyrate consistent with diabetic ketoacidosis 

with an elevated anion gap at 28.  He received IV fluids.  He received Phenergan

for his nausea vomiting.





Hospitalist ROS





- Review of Systems


Constitutional: reports: chills.  denies: fever


Respiratory: denies: cough, shortness of breath


Cardiovascular: denies: chest pain, palpitations


Gastrointestinal: reports: nausea, vomiting.  denies: abdominal pain, diarrhea, 

constipation


All other systems reviewed; all pertinent +/- noted in HPI/Subj





- Medication


Medications: 


Aspirin Childrens





 


TABLET, CHEWABLE : Strength - 81 mg : ORAL


Patient Dose: 1 tab(s) Oral once a day.





HumaLOG Mix 50-50 Insuln U-100





 


VIAL (SDV,MDV OR ADDITIVE) (ML) : Strength - 100 unit/mL (50-50) : SUBCUTANEOUS


Patient Dose:  units Subcutaneous.AS NEEDED SLIDING SCALE.





Lantus U-100 Insulin





 


VIAL (SDV,MDV OR ADDITIVE) (ML) : Strength - 100 unit/mL : SUBCUTANEOUS


Patient Dose: 36 units Subcutaneous once a day.





lisinopril





 


TABLET : Strength - 20 mg : ORAL


Patient Dose: 1 tab(s) Oral once a day.





metoprolol tartrate oral





 


TABLET : Strength - 25 mg : ORAL


Patient Dose: 12.5 mg Oral 2 times a day.





lovastatin





 


tablet : Strength - 20 mg : ORAL


Patient Dose: 20 mg Oral once a day.





metoclopramide oral





 


tablet : Strength - 10 mg : ORAL


Patient Dose: 10 mg Oral once a day.





Hospitalist History





- Past Medical History


Source: patient


Cardiac: reports: HTN


Musculoskeletal: reports: Other (Left BKA)


ENT: reports: Other (Vision impairment due to diabetic retinopathy)





- Past Surgical History


Past Surgical History: reports: Other (Left BKA, bilateral eye surgery due to of

diabetic retinopathy.)





- Social History


Alcohol: reports: None


Drugs: reports: none


Occupation: Uses e-cigarettes.  Ambulates with a cane or walker due to left BKA.





- Exam


General Appearance: ill appearing


Eye: PERRL


Neck: supple, symmetric, no JVD, no thyromegaly, no lymphadenopathy, no carotid 

bruit


Heart: RRR, no murmur, no gallops, no rubs, normal peripheral pulses


Respiratory: CTAB, no wheezes, no rales, no ronchi, normal chest expansion, no 

tachypnea, normal percussion


Gastrointestinal: soft, non-tender, non-distended, normal bowel sounds, no 

palpable masses, no hepatomegaly, no splenomegaly, no bruit


Extremities: no cyanosis, no clubbing, no edema


Extremities - other findings: Left BKA


Skin: normal turgor


Neurological: no focal deficits


Musculoskeletal: generalized weakness


Psychiatric: lethargic





Hospitalist Results





- Labs


Result Diagrams: 


                                 01/16/21 14:14





                                 01/16/21 14:14


Lab results: 


                                        











WBC  14.1 thou/uL (4.8-10.8)  H  01/16/21  14:14    


 


Hgb  11.4 g/dL (14.0-18.0)  L  01/16/21  14:14    


 


Hct  34.2 % (42.0-52.0)  L  01/16/21  14:14    


 


MCV  92.4 fL (78.0-98.0)   01/16/21  14:14    


 


Plt Count  184 thou/uL (130-400)   01/16/21  14:14    


 


Neutrophils %  87.0 % (42.0-75.0)  H  01/16/21  14:14    


 


Sodium  136 mmol/L (136-145)   01/16/21  14:14    


 


Potassium  4.6 mmol/L (3.5-5.1)   01/16/21  14:14    


 


Chloride  93 mmol/L ()  L  01/16/21  14:14    


 


Carbon Dioxide  20 mmol/L (22-29)  L  01/16/21  14:14    


 


BUN  51 mg/dL (8.9-20.6)  H  01/16/21  14:14    


 


Creatinine  3.29 mg/dL (0.7-1.3)  H  01/16/21  14:14    


 


Glucose  373 mg/dL ()  H  01/16/21  14:14    


 


Calcium  9.3 mg/dL (7.8-10.44)   01/16/21  14:14    


 


Total Bilirubin  0.6 mg/dL (0.2-1.2)   01/16/21  14:14    


 


AST  23 U/L (5-34)   01/16/21  14:14    


 


ALT  17 U/L (8-55)   01/16/21  14:14    


 


Alkaline Phosphatase  80 U/L ()   01/16/21  14:14    


 


CK-MB (CK-2)  1.7 ng/mL (0-6.6)   01/16/21  14:14    


 


Troponin I  0.030 ng/mL (< 0.028)  H  01/16/21  14:14    


 


Serum Total Protein  7.3 g/dL (6.0-8.3)   01/16/21  14:14    


 


Albumin  4.3 g/dL (3.5-5.0)   01/16/21  14:14    














- Radiology Interpretation


  ** Chest x-ray


Status: image reviewed by me, report reviewed by me (No acute findings)





Hospitalist H&P A/P





- Problem


(1) DKA (diabetic ketoacidoses)


Code(s): E11.10 - TYPE 2 DIABETES MELLITUS WITH KETOACIDOSIS WITHOUT COMA   

Status: Acute   





(2) Gastroparesis due to DM


Code(s): E11.43 - TYPE 2 DIABETES W DIABETIC AUTONOMIC (POLY)NEUROPATHY; K31.84 

- GASTROPARESIS   Status: Acute   





(3) HLD (hyperlipidemia)


Code(s): E78.5 - HYPERLIPIDEMIA, UNSPECIFIED   Status: Acute   





(4) HTN (hypertension)


Code(s): I10 - ESSENTIAL (PRIMARY) HYPERTENSION   Status: Acute   





(5) Intractable nausea and vomiting


Code(s): R11.2 - NAUSEA WITH VOMITING, UNSPECIFIED   Status: Acute   





(6) CKD (chronic kidney disease), stage IV


Code(s): N18.4 - CHRONIC KIDNEY DISEASE, STAGE 4 (SEVERE)   Status: Acute   





- Plan


Plan: 





Diabetic ketoacidosis:


No evidence of underlying infection or other provocation.


Admit the patient to the [facility] on diabetic ketoacidosis protocol.


Treat the underlying nausea.





Nausea vomiting:


Patient has gastroparesis and this may be the source.


Continue with symptomatic treatment.


May consider resumption of his Reglan.





Diabetic gastroparesis:


As above may need to consider Reglan IV if his symptoms do not resolve promptly 

with hydration.





Hypertension: 


Continue metoprolol and lisinopril as tolerated.





Hyperlipidemia:


Resume statin as tolerated.





CKD stage IV:


Patient's renal function is essentially at his baseline.





Leukocytosis:


Likely reactive to the DKA.


Recheck in the morning.


Do not see an indication for antibiotics at this time.

## 2021-01-16 NOTE — RAD
EXAM: 

CHEST ONE VIEW



HISTORY:

Nausea and vomiting for 3 days.



COMPARISON:

2/25/2020



FINDINGS:

The cardiac silhouette and pulmonary vasculature are within normal limits. The lungs are clear. No in
terval change from prior study.



IMPRESSION:

No acute cardiopulmonary process.



Reported By: Fabián Stewart 

Electronically Signed:  1/16/2021 2:55 PM

## 2021-01-17 VITALS — TEMPERATURE: 99.5 F

## 2021-01-17 LAB
ANION GAP SERPL CALC-SCNC: 14 MMOL/L (ref 10–20)
BUN SERPL-MCNC: 42 MG/DL (ref 8.9–20.6)
CALCIUM SERPL-MCNC: 7.7 MG/DL (ref 7.8–10.44)
CHLORIDE SERPL-SCNC: 105 MMOL/L (ref 98–107)
CO2 SERPL-SCNC: 24 MMOL/L (ref 22–29)
CREAT CL PREDICTED SERPL C-G-VRATE: 59 ML/MIN (ref 70–130)
GLUCOSE SERPL-MCNC: 220 MG/DL (ref 70–105)
POTASSIUM SERPL-SCNC: 4.1 MMOL/L (ref 3.5–5.1)
SODIUM SERPL-SCNC: 139 MMOL/L (ref 136–145)

## 2021-01-17 RX ADMIN — HEPARIN SODIUM SCH UNITS: 5000 INJECTION, SOLUTION INTRAVENOUS; SUBCUTANEOUS at 14:52

## 2021-01-17 RX ADMIN — POTASSIUM CHLORIDE, DEXTROSE MONOHYDRATE AND SODIUM CHLORIDE PRN MLS: 150; 5; 450 INJECTION, SOLUTION INTRAVENOUS at 04:14

## 2021-01-17 RX ADMIN — ONDANSETRON PRN MG: 2 INJECTION INTRAMUSCULAR; INTRAVENOUS at 02:15

## 2021-01-17 RX ADMIN — HEPARIN SODIUM SCH UNITS: 5000 INJECTION, SOLUTION INTRAVENOUS; SUBCUTANEOUS at 09:52

## 2021-01-17 RX ADMIN — POTASSIUM CHLORIDE, DEXTROSE MONOHYDRATE AND SODIUM CHLORIDE PRN MLS: 150; 5; 450 INJECTION, SOLUTION INTRAVENOUS at 08:04

## 2021-01-17 NOTE — PDOC.HOSPP
- Subjective


Encounter Date: 01/17/21


Subjective: 


The patient still complaining of persistent nausea, vomiting and abdominal 

discomfort.





- Objective


Vital Signs & Weight: 


                             Vital Signs (12 hours)











  Temp


 


 01/17/21 12:00  98.5 F


 


 01/17/21 08:00  99.8 F H


 


 01/17/21 04:00  99.4 F








                                     Weight











Weight                         232 lb 12.93 oz











                            Most Recent Monitor Data











Heart Rate from ECG            77


 


NIBP                           156/74


 


NIBP BP-Mean                   101


 


Respiration from ECG           17


 


SpO2                           99














I&O: 


                                        











 01/16/21 01/17/21 01/18/21





 06:59 06:59 06:59


 


Intake Total  4502.2 428.4


 


Output Total  1025 1800


 


Balance  3477.2 -1371.6











Result Diagrams: 


                                 01/16/21 14:14





                                 01/17/21 03:20


Additional Labs: 


                                   Accuchecks











  01/17/21 01/17/21 01/17/21





  11:22 08:59 08:07


 


POC Glucose  167 H  206 H  226 H














  01/17/21 01/17/21 01/17/21





  07:23 06:03 05:09


 


POC Glucose  230 H  217 H  224 H














  01/17/21 01/17/21 01/17/21





  04:16 02:04 01:17


 


POC Glucose  226 H  150 H  138 H














  01/16/21 01/16/21 01/16/21





  23:58 23:00 22:10


 


POC Glucose  98  109 H  114 H














  01/16/21 01/16/21 01/16/21





  21:10 20:00 18:41


 


POC Glucose  128 H  160 H  232 H














  01/16/21 01/16/21





  16:21 13:56


 


POC Glucose  321 H  312 H














Hospitalist ROS





- Medication


Medications: 


Active Medications











Generic Name Dose Route Start Last Admin





  Trade Name Freq  PRN Reason Stop Dose Admin


 


Aspirin  81 mg  01/17/21 09:00  01/17/21 08:12





  Aspirin Chewable 81 Mg Tab  PO   Not Given





  DAILY PASQUALE  


 


Famotidine  20 mg  01/16/21 21:00  01/17/21 08:12





  Famotidine 20 Mg Tab  PO   Not Given





  BID PASQUALE  


 


Heparin Sodium (Porcine)  5,000 units  01/16/21 21:00  01/17/21 09:52





  Heparin 5,000 Units/Ml Vial  SC   5,000 units





  TID PASQUALE   Administration


 


Sodium Chloride  1,000 mls @ 125 mls/hr  01/17/21 10:00  01/17/21 10:06





  Normal Saline 0.9%  IV   1,000 mls





  .Q8H PASQUALE   Administration


 


Metoclopramide HCl  10 mg  01/16/21 18:24  01/17/21 08:04





  Metoclopramide Hcl 10 Mg/2 Ml Vial  IVP   10 mg





  Q6H PRN   Administration





  Nausea/Vomiting  


 


Metoclopramide HCl  10 mg  01/17/21 11:45  01/17/21 11:44





  Metoclopramide Hcl 10 Mg/2 Ml Vial  IVP  01/17/21 14:00  10 mg





  NOW PASQUALE   Administration


 


Metoprolol Succinate  12.5 mg  01/17/21 09:00  01/17/21 02:36





  Metoprolol Succinate Xl 25 Mg Tab  PO   12.5 mg





  DAILY PASQUALE   Administration


 


Scopolamine  1.5 mg  01/17/21 10:00  01/17/21 10:05





  Scopolamine 1.5 Mg/72 Hour Patch  TD   1.5 mg





  Q3D PASQUALE   Administration














- Exam


General Appearance: awake alert


ENT: normocephalic atraumatic


Neck: supple


Heart: RRR


Respiratory: normal chest expansion, no tachypnea


Extremities: no cyanosis, no clubbing


Neurological: cranial nerve grossly intact, no focal deficits





Hosp A/P


(1) DKA (diabetic ketoacidoses)


Code(s): E11.10 - TYPE 2 DIABETES MELLITUS WITH KETOACIDOSIS WITHOUT COMA   

Status: Acute   





(2) Gastroparesis due to DM


Code(s): E11.43 - TYPE 2 DIABETES W DIABETIC AUTONOMIC (POLY)NEUROPATHY; K31.84 

- GASTROPARESIS   Status: Acute   





(3) HLD (hyperlipidemia)


Code(s): E78.5 - HYPERLIPIDEMIA, UNSPECIFIED   Status: Acute   





(4) HTN (hypertension)


Code(s): I10 - ESSENTIAL (PRIMARY) HYPERTENSION   Status: Acute   





(5) CKD (chronic kidney disease), stage IV


Code(s): N18.4 - CHRONIC KIDNEY DISEASE, STAGE 4 (SEVERE)   Status: Acute   





- Plan





DKA have now resolved.


Started on subcu Lantus and medium sliding scale.


Continue normal saline for hydration.


To chloride scheduled for nausea due to gastroparesis.


Clear liquid diet for now.


He can be transferred to telemetry.

## 2021-02-06 NOTE — EKG
Test Reason : 

Blood Pressure : ***/*** mmHG

Vent. Rate : 114 BPM     Atrial Rate : 114 BPM

   P-R Int : 134 ms          QRS Dur : 076 ms

    QT Int : 336 ms       P-R-T Axes : 072 -54 055 degrees

   QTc Int : 463 ms

 

Sinus tachycardia

Left anterior fascicular block

Abnormal ECG

 

Confirmed by CRISTI REED, ANUM OLSON ([id number]),  ALEYDA ELIAS ([id number]) on 2/6/2021 4:39:57 PM

 

Referred By:             Confirmed By:ANUM COLIN MD

## 2021-10-22 ENCOUNTER — HOSPITAL ENCOUNTER (INPATIENT)
Dept: HOSPITAL 92 - ERS | Age: 41
LOS: 3 days | Discharge: LEFT BEFORE DISCHARGE | DRG: 638 | End: 2021-10-25
Attending: INTERNAL MEDICINE | Admitting: HOSPITALIST
Payer: COMMERCIAL

## 2021-10-22 VITALS — BODY MASS INDEX: 27.7 KG/M2

## 2021-10-22 DIAGNOSIS — Z89.512: ICD-10-CM

## 2021-10-22 DIAGNOSIS — Z79.82: ICD-10-CM

## 2021-10-22 DIAGNOSIS — Z91.14: ICD-10-CM

## 2021-10-22 DIAGNOSIS — E10.21: ICD-10-CM

## 2021-10-22 DIAGNOSIS — N18.4: ICD-10-CM

## 2021-10-22 DIAGNOSIS — N17.9: ICD-10-CM

## 2021-10-22 DIAGNOSIS — E83.42: ICD-10-CM

## 2021-10-22 DIAGNOSIS — F12.10: ICD-10-CM

## 2021-10-22 DIAGNOSIS — Z79.4: ICD-10-CM

## 2021-10-22 DIAGNOSIS — E10.22: ICD-10-CM

## 2021-10-22 DIAGNOSIS — Z79.899: ICD-10-CM

## 2021-10-22 DIAGNOSIS — E83.39: ICD-10-CM

## 2021-10-22 DIAGNOSIS — E10.43: ICD-10-CM

## 2021-10-22 DIAGNOSIS — D64.9: ICD-10-CM

## 2021-10-22 DIAGNOSIS — E86.0: ICD-10-CM

## 2021-10-22 DIAGNOSIS — E78.5: ICD-10-CM

## 2021-10-22 DIAGNOSIS — Z87.891: ICD-10-CM

## 2021-10-22 DIAGNOSIS — K31.84: ICD-10-CM

## 2021-10-22 DIAGNOSIS — E10.10: Primary | ICD-10-CM

## 2021-10-22 DIAGNOSIS — E87.1: ICD-10-CM

## 2021-10-22 DIAGNOSIS — I12.9: ICD-10-CM

## 2021-10-22 LAB
ALBUMIN SERPL BCG-MCNC: 3.9 G/DL (ref 3.5–5)
ALP SERPL-CCNC: 79 U/L (ref 40–110)
ALT SERPL W P-5'-P-CCNC: 21 U/L (ref 8–55)
ANALYZER IN CARDIO: (no result)
ANION GAP SERPL CALC-SCNC: 17 MMOL/L (ref 10–20)
ANION GAP SERPL CALC-SCNC: 17 MMOL/L (ref 10–20)
ANION GAP SERPL CALC-SCNC: 22 MMOL/L (ref 10–20)
ANION GAP SERPL CALC-SCNC: 26 MMOL/L (ref 10–20)
AST SERPL-CCNC: 22 U/L (ref 5–34)
BASE EXCESS STD BLDV CALC-SCNC: -3.9 MEQ/L
BASOPHILS # BLD AUTO: 0 THOU/UL (ref 0–0.2)
BASOPHILS NFR BLD AUTO: 0.1 % (ref 0–1)
BILIRUB SERPL-MCNC: 0.5 MG/DL (ref 0.2–1.2)
BUN SERPL-MCNC: 43 MG/DL (ref 8.9–20.6)
BUN SERPL-MCNC: 43 MG/DL (ref 8.9–20.6)
BUN SERPL-MCNC: 44 MG/DL (ref 8.9–20.6)
BUN SERPL-MCNC: 47 MG/DL (ref 8.9–20.6)
CA-I BLDV-MCNC: 1.06 MMOL/L (ref 1.16–1.32)
CALCIUM SERPL-MCNC: 8.2 MG/DL (ref 7.8–10.44)
CALCIUM SERPL-MCNC: 8.9 MG/DL (ref 7.8–10.44)
CALCIUM SERPL-MCNC: 8.9 MG/DL (ref 7.8–10.44)
CALCIUM SERPL-MCNC: 9.7 MG/DL (ref 7.8–10.44)
CHLORIDE BLDV-SCNC: 104 MMOL/L (ref 98–106)
CHLORIDE SERPL-SCNC: 101 MMOL/L (ref 98–107)
CHLORIDE SERPL-SCNC: 104 MMOL/L (ref 98–107)
CHLORIDE SERPL-SCNC: 107 MMOL/L (ref 98–107)
CHLORIDE SERPL-SCNC: 107 MMOL/L (ref 98–107)
CK MB SERPL-MCNC: 3 NG/ML (ref 0–6.6)
CO2 SERPL-SCNC: 18 MMOL/L (ref 22–29)
CO2 SERPL-SCNC: 18 MMOL/L (ref 22–29)
CO2 SERPL-SCNC: 19 MMOL/L (ref 22–29)
CO2 SERPL-SCNC: 22 MMOL/L (ref 22–29)
CREAT CL PREDICTED SERPL C-G-VRATE: 0 ML/MIN (ref 70–130)
CREAT CL PREDICTED SERPL C-G-VRATE: 42 ML/MIN (ref 70–130)
EOSINOPHIL # BLD AUTO: 0 THOU/UL (ref 0–0.7)
EOSINOPHIL NFR BLD AUTO: 0.1 % (ref 0–10)
GLOBULIN SER CALC-MCNC: 2.7 G/DL (ref 2.4–3.5)
GLUCOSE SERPL-MCNC: 214 MG/DL (ref 70–105)
GLUCOSE SERPL-MCNC: 234 MG/DL (ref 70–105)
GLUCOSE SERPL-MCNC: 419 MG/DL (ref 70–105)
GLUCOSE SERPL-MCNC: 476 MG/DL (ref 70–105)
HCO3 BLDV-SCNC: 19 MEQ/L (ref 22–28)
HCT VFR BLDV CALC: 30 % (ref 42–52)
HGB BLD-MCNC: 10.2 G/DL (ref 14–18)
HGB BLDV-MCNC: 10.1 G/DL (ref 13.2–17.3)
LIPASE SERPL-CCNC: 16 U/L (ref 8–78)
LYMPHOCYTES # BLD: 1 THOU/UL (ref 1.2–3.4)
LYMPHOCYTES NFR BLD AUTO: 6.6 % (ref 21–51)
MAGNESIUM SERPL-MCNC: 2.1 MG/DL (ref 1.6–2.6)
MCH RBC QN AUTO: 31.9 PG (ref 27–31)
MCV RBC AUTO: 94.8 FL (ref 78–98)
MONOCYTES # BLD AUTO: 0.5 THOU/UL (ref 0.11–0.59)
MONOCYTES NFR BLD AUTO: 3.5 % (ref 0–10)
NEUTROPHILS # BLD AUTO: 12.9 THOU/UL (ref 1.4–6.5)
NEUTROPHILS NFR BLD AUTO: 89.6 % (ref 42–75)
PLATELET # BLD AUTO: 205 THOU/UL (ref 130–400)
POTASSIUM BLDV-SCNC: 3.91 MMOL/L (ref 3.7–5.3)
POTASSIUM SERPL-SCNC: 3.5 MMOL/L (ref 3.5–5.1)
POTASSIUM SERPL-SCNC: 3.7 MMOL/L (ref 3.5–5.1)
POTASSIUM SERPL-SCNC: 3.8 MMOL/L (ref 3.5–5.1)
POTASSIUM SERPL-SCNC: 4.1 MMOL/L (ref 3.5–5.1)
PROT UR STRIP.AUTO-MCNC: 300 MG/DL
RBC # BLD AUTO: 3.19 MILL/UL (ref 4.7–6.1)
RBC UR QL AUTO: (no result) HPF (ref 0–3)
SODIUM BLDV-SCNC: 138.7 MMOL/L (ref 133–146)
SODIUM SERPL-SCNC: 139 MMOL/L (ref 136–145)
SODIUM SERPL-SCNC: 140 MMOL/L (ref 136–145)
SODIUM SERPL-SCNC: 141 MMOL/L (ref 136–145)
SODIUM SERPL-SCNC: 142 MMOL/L (ref 136–145)
SP GR UR STRIP: 1.01 (ref 1–1.04)
WBC # BLD AUTO: 14.4 THOU/UL (ref 4.8–10.8)
WBC UR QL AUTO: (no result) HPF (ref 0–3)

## 2021-10-22 PROCEDURE — 83735 ASSAY OF MAGNESIUM: CPT

## 2021-10-22 PROCEDURE — 81015 MICROSCOPIC EXAM OF URINE: CPT

## 2021-10-22 PROCEDURE — 36416 COLLJ CAPILLARY BLOOD SPEC: CPT

## 2021-10-22 PROCEDURE — 82805 BLOOD GASES W/O2 SATURATION: CPT

## 2021-10-22 PROCEDURE — 85025 COMPLETE CBC W/AUTO DIFF WBC: CPT

## 2021-10-22 PROCEDURE — 96366 THER/PROPH/DIAG IV INF ADDON: CPT

## 2021-10-22 PROCEDURE — 80048 BASIC METABOLIC PNL TOTAL CA: CPT

## 2021-10-22 PROCEDURE — S0028 INJECTION, FAMOTIDINE, 20 MG: HCPCS

## 2021-10-22 PROCEDURE — 96361 HYDRATE IV INFUSION ADD-ON: CPT

## 2021-10-22 PROCEDURE — 84484 ASSAY OF TROPONIN QUANT: CPT

## 2021-10-22 PROCEDURE — C9113 INJ PANTOPRAZOLE SODIUM, VIA: HCPCS

## 2021-10-22 PROCEDURE — 82553 CREATINE MB FRACTION: CPT

## 2021-10-22 PROCEDURE — 74018 RADEX ABDOMEN 1 VIEW: CPT

## 2021-10-22 PROCEDURE — 80053 COMPREHEN METABOLIC PANEL: CPT

## 2021-10-22 PROCEDURE — 82010 KETONE BODYS QUAN: CPT

## 2021-10-22 PROCEDURE — 83690 ASSAY OF LIPASE: CPT

## 2021-10-22 PROCEDURE — 96375 TX/PRO/DX INJ NEW DRUG ADDON: CPT

## 2021-10-22 PROCEDURE — 81003 URINALYSIS AUTO W/O SCOPE: CPT

## 2021-10-22 PROCEDURE — 71045 X-RAY EXAM CHEST 1 VIEW: CPT

## 2021-10-22 PROCEDURE — 96365 THER/PROPH/DIAG IV INF INIT: CPT

## 2021-10-22 PROCEDURE — 36415 COLL VENOUS BLD VENIPUNCTURE: CPT

## 2021-10-22 PROCEDURE — 93005 ELECTROCARDIOGRAM TRACING: CPT

## 2021-10-22 PROCEDURE — 96367 TX/PROPH/DG ADDL SEQ IV INF: CPT

## 2021-10-22 PROCEDURE — 84100 ASSAY OF PHOSPHORUS: CPT

## 2021-10-22 PROCEDURE — 80306 DRUG TEST PRSMV INSTRMNT: CPT

## 2021-10-22 RX ADMIN — FAMOTIDINE SCH MG: 10 INJECTION, SOLUTION INTRAVENOUS at 20:33

## 2021-10-22 RX ADMIN — ONDANSETRON PRN MG: 2 INJECTION INTRAMUSCULAR; INTRAVENOUS at 20:33

## 2021-10-22 RX ADMIN — POTASSIUM CHLORIDE, DEXTROSE MONOHYDRATE AND SODIUM CHLORIDE PRN MLS: 150; 5; 450 INJECTION, SOLUTION INTRAVENOUS at 20:44

## 2021-10-23 LAB
ALBUMIN SERPL BCG-MCNC: 2.9 G/DL (ref 3.5–5)
ALP SERPL-CCNC: 55 U/L (ref 40–110)
ALT SERPL W P-5'-P-CCNC: 14 U/L (ref 8–55)
ANION GAP SERPL CALC-SCNC: 13 MMOL/L (ref 10–20)
ANION GAP SERPL CALC-SCNC: 17 MMOL/L (ref 10–20)
AST SERPL-CCNC: 30 U/L (ref 5–34)
BASOPHILS # BLD AUTO: 0 THOU/UL (ref 0–0.2)
BASOPHILS NFR BLD AUTO: 0.1 % (ref 0–1)
BILIRUB SERPL-MCNC: 0.2 MG/DL (ref 0.2–1.2)
BUN SERPL-MCNC: 37 MG/DL (ref 8.9–20.6)
BUN SERPL-MCNC: 43 MG/DL (ref 8.9–20.6)
CALCIUM SERPL-MCNC: 7.9 MG/DL (ref 7.8–10.44)
CALCIUM SERPL-MCNC: 7.9 MG/DL (ref 7.8–10.44)
CHLORIDE SERPL-SCNC: 107 MMOL/L (ref 98–107)
CHLORIDE SERPL-SCNC: 108 MMOL/L (ref 98–107)
CO2 SERPL-SCNC: 18 MMOL/L (ref 22–29)
CO2 SERPL-SCNC: 18 MMOL/L (ref 22–29)
CREAT CL PREDICTED SERPL C-G-VRATE: 43 ML/MIN (ref 70–130)
CREAT CL PREDICTED SERPL C-G-VRATE: 48 ML/MIN (ref 70–130)
EOSINOPHIL # BLD AUTO: 0 THOU/UL (ref 0–0.7)
EOSINOPHIL NFR BLD AUTO: 0.2 % (ref 0–10)
GLOBULIN SER CALC-MCNC: 2.7 G/DL (ref 2.4–3.5)
GLUCOSE SERPL-MCNC: 121 MG/DL (ref 70–105)
GLUCOSE SERPL-MCNC: 264 MG/DL (ref 70–105)
HGB BLD-MCNC: 8.9 G/DL (ref 14–18)
LYMPHOCYTES # BLD: 1.6 THOU/UL (ref 1.2–3.4)
LYMPHOCYTES NFR BLD AUTO: 11.1 % (ref 21–51)
MCH RBC QN AUTO: 31.9 PG (ref 27–31)
MCV RBC AUTO: 95.7 FL (ref 78–98)
MONOCYTES # BLD AUTO: 1.2 THOU/UL (ref 0.11–0.59)
MONOCYTES NFR BLD AUTO: 8.5 % (ref 0–10)
NEUTROPHILS # BLD AUTO: 11.7 THOU/UL (ref 1.4–6.5)
NEUTROPHILS NFR BLD AUTO: 80.1 % (ref 42–75)
PLATELET # BLD AUTO: 125 THOU/UL (ref 130–400)
POTASSIUM SERPL-SCNC: 3.9 MMOL/L (ref 3.5–5.1)
POTASSIUM SERPL-SCNC: 4.3 MMOL/L (ref 3.5–5.1)
RBC # BLD AUTO: 2.77 MILL/UL (ref 4.7–6.1)
SODIUM SERPL-SCNC: 134 MMOL/L (ref 136–145)
SODIUM SERPL-SCNC: 139 MMOL/L (ref 136–145)
WBC # BLD AUTO: 14.6 THOU/UL (ref 4.8–10.8)

## 2021-10-23 RX ADMIN — ONDANSETRON PRN MG: 2 INJECTION INTRAMUSCULAR; INTRAVENOUS at 01:38

## 2021-10-23 RX ADMIN — FAMOTIDINE SCH MG: 10 INJECTION, SOLUTION INTRAVENOUS at 08:10

## 2021-10-23 RX ADMIN — ONDANSETRON PRN MG: 2 INJECTION INTRAMUSCULAR; INTRAVENOUS at 20:58

## 2021-10-23 RX ADMIN — POTASSIUM CHLORIDE, DEXTROSE MONOHYDRATE AND SODIUM CHLORIDE PRN MLS: 150; 5; 450 INJECTION, SOLUTION INTRAVENOUS at 04:10

## 2021-10-23 RX ADMIN — ONDANSETRON PRN MG: 2 INJECTION INTRAMUSCULAR; INTRAVENOUS at 13:25

## 2021-10-23 RX ADMIN — FAMOTIDINE SCH MG: 10 INJECTION, SOLUTION INTRAVENOUS at 20:58

## 2021-10-23 RX ADMIN — POTASSIUM CHLORIDE, DEXTROSE MONOHYDRATE AND SODIUM CHLORIDE PRN MLS: 150; 5; 450 INJECTION, SOLUTION INTRAVENOUS at 01:29

## 2021-10-23 RX ADMIN — POTASSIUM CHLORIDE, DEXTROSE MONOHYDRATE AND SODIUM CHLORIDE PRN MLS: 150; 5; 450 INJECTION, SOLUTION INTRAVENOUS at 21:05

## 2021-10-23 RX ADMIN — POTASSIUM CHLORIDE, DEXTROSE MONOHYDRATE AND SODIUM CHLORIDE PRN MLS: 150; 5; 450 INJECTION, SOLUTION INTRAVENOUS at 15:44

## 2021-10-23 RX ADMIN — POTASSIUM CHLORIDE, DEXTROSE MONOHYDRATE AND SODIUM CHLORIDE PRN MLS: 150; 5; 450 INJECTION, SOLUTION INTRAVENOUS at 11:59

## 2021-10-24 LAB
ANION GAP SERPL CALC-SCNC: 11 MMOL/L (ref 10–20)
BASOPHILS # BLD AUTO: 0 THOU/UL (ref 0–0.2)
BASOPHILS NFR BLD AUTO: 0.2 % (ref 0–1)
BUN SERPL-MCNC: 37 MG/DL (ref 8.9–20.6)
CALCIUM SERPL-MCNC: 7.8 MG/DL (ref 7.8–10.44)
CHLORIDE SERPL-SCNC: 109 MMOL/L (ref 98–107)
CO2 SERPL-SCNC: 21 MMOL/L (ref 22–29)
CREAT CL PREDICTED SERPL C-G-VRATE: 52 ML/MIN (ref 70–130)
EOSINOPHIL # BLD AUTO: 0 THOU/UL (ref 0–0.7)
EOSINOPHIL NFR BLD AUTO: 0.4 % (ref 0–10)
GLUCOSE SERPL-MCNC: 178 MG/DL (ref 70–105)
HGB BLD-MCNC: 9.1 G/DL (ref 14–18)
LYMPHOCYTES # BLD: 1.6 THOU/UL (ref 1.2–3.4)
LYMPHOCYTES NFR BLD AUTO: 15.7 % (ref 21–51)
MCH RBC QN AUTO: 32.5 PG (ref 27–31)
MCV RBC AUTO: 94 FL (ref 78–98)
MONOCYTES # BLD AUTO: 0.7 THOU/UL (ref 0.11–0.59)
MONOCYTES NFR BLD AUTO: 7.3 % (ref 0–10)
NEUTROPHILS # BLD AUTO: 7.6 THOU/UL (ref 1.4–6.5)
NEUTROPHILS NFR BLD AUTO: 76.4 % (ref 42–75)
PLATELET # BLD AUTO: 150 THOU/UL (ref 130–400)
POTASSIUM SERPL-SCNC: 4.1 MMOL/L (ref 3.5–5.1)
RBC # BLD AUTO: 2.79 MILL/UL (ref 4.7–6.1)
SODIUM SERPL-SCNC: 137 MMOL/L (ref 136–145)
WBC # BLD AUTO: 10 THOU/UL (ref 4.8–10.8)

## 2021-10-24 RX ADMIN — POTASSIUM CHLORIDE, DEXTROSE MONOHYDRATE AND SODIUM CHLORIDE PRN MLS: 150; 5; 450 INJECTION, SOLUTION INTRAVENOUS at 06:08

## 2021-10-24 RX ADMIN — SALINE NASAL SPRAY SCH APPLIC: 1.5 SOLUTION NASAL at 20:36

## 2021-10-24 RX ADMIN — ONDANSETRON PRN MG: 2 INJECTION INTRAMUSCULAR; INTRAVENOUS at 15:51

## 2021-10-24 RX ADMIN — INSULIN GLARGINE SCH UNIT: 100 INJECTION, SOLUTION SUBCUTANEOUS at 20:06

## 2021-10-24 RX ADMIN — ONDANSETRON PRN MG: 2 INJECTION INTRAMUSCULAR; INTRAVENOUS at 02:49

## 2021-10-24 RX ADMIN — ONDANSETRON PRN MG: 2 INJECTION INTRAMUSCULAR; INTRAVENOUS at 08:56

## 2021-10-24 RX ADMIN — FAMOTIDINE SCH MG: 10 INJECTION, SOLUTION INTRAVENOUS at 07:51

## 2021-10-24 RX ADMIN — NIFEDIPINE SCH MG: 90 TABLET, EXTENDED RELEASE ORAL at 07:52

## 2021-10-24 RX ADMIN — ASPIRIN 81 MG CHEWABLE TABLET SCH MG: 81 TABLET CHEWABLE at 07:51

## 2021-10-24 RX ADMIN — POTASSIUM CHLORIDE, DEXTROSE MONOHYDRATE AND SODIUM CHLORIDE PRN MLS: 150; 5; 450 INJECTION, SOLUTION INTRAVENOUS at 00:15

## 2021-10-25 VITALS — DIASTOLIC BLOOD PRESSURE: 76 MMHG | TEMPERATURE: 99.3 F | SYSTOLIC BLOOD PRESSURE: 160 MMHG

## 2021-10-25 LAB
ANION GAP SERPL CALC-SCNC: 12 MMOL/L (ref 10–20)
BASOPHILS # BLD AUTO: 0 THOU/UL (ref 0–0.2)
BASOPHILS NFR BLD AUTO: 0.2 % (ref 0–1)
BUN SERPL-MCNC: 28 MG/DL (ref 8.9–20.6)
CALCIUM SERPL-MCNC: 7.7 MG/DL (ref 7.8–10.44)
CHLORIDE SERPL-SCNC: 107 MMOL/L (ref 98–107)
CO2 SERPL-SCNC: 20 MMOL/L (ref 22–29)
CREAT CL PREDICTED SERPL C-G-VRATE: 57 ML/MIN (ref 70–130)
DRUG SCREEN CUTOFF: (no result)
EOSINOPHIL # BLD AUTO: 0 THOU/UL (ref 0–0.7)
EOSINOPHIL NFR BLD AUTO: 0.2 % (ref 0–10)
GLUCOSE SERPL-MCNC: 319 MG/DL (ref 70–105)
HGB BLD-MCNC: 9.1 G/DL (ref 14–18)
LYMPHOCYTES # BLD: 1.3 THOU/UL (ref 1.2–3.4)
LYMPHOCYTES NFR BLD AUTO: 15.7 % (ref 21–51)
MAGNESIUM SERPL-MCNC: 1.6 MG/DL (ref 1.6–2.6)
MCH RBC QN AUTO: 32.2 PG (ref 27–31)
MCV RBC AUTO: 94 FL (ref 78–98)
MONOCYTES # BLD AUTO: 0.6 THOU/UL (ref 0.11–0.59)
MONOCYTES NFR BLD AUTO: 7.9 % (ref 0–10)
NEUTROPHILS # BLD AUTO: 6.2 THOU/UL (ref 1.4–6.5)
NEUTROPHILS NFR BLD AUTO: 76 % (ref 42–75)
PLATELET # BLD AUTO: 137 THOU/UL (ref 130–400)
POTASSIUM SERPL-SCNC: 4.3 MMOL/L (ref 3.5–5.1)
RBC # BLD AUTO: 2.83 MILL/UL (ref 4.7–6.1)
SODIUM SERPL-SCNC: 135 MMOL/L (ref 136–145)
WBC # BLD AUTO: 8.1 THOU/UL (ref 4.8–10.8)

## 2021-10-25 RX ADMIN — INSULIN LISPRO PRN UNITS: 100 INJECTION, SOLUTION INTRAVENOUS; SUBCUTANEOUS at 12:30

## 2021-10-25 RX ADMIN — INSULIN LISPRO PRN UNITS: 100 INJECTION, SOLUTION INTRAVENOUS; SUBCUTANEOUS at 16:39

## 2021-10-25 RX ADMIN — INSULIN GLARGINE SCH UNIT: 100 INJECTION, SOLUTION SUBCUTANEOUS at 08:32

## 2021-10-25 RX ADMIN — SODIUM BICARBONATE SCH: 84 INJECTION, SOLUTION INTRAVENOUS at 19:42

## 2021-10-25 RX ADMIN — SODIUM BICARBONATE SCH MLS: 84 INJECTION, SOLUTION INTRAVENOUS at 10:19

## 2021-10-25 RX ADMIN — ONDANSETRON PRN MG: 2 INJECTION INTRAMUSCULAR; INTRAVENOUS at 08:37

## 2021-10-25 RX ADMIN — SALINE NASAL SPRAY SCH APPLIC: 1.5 SOLUTION NASAL at 08:32

## 2021-10-25 RX ADMIN — SALINE NASAL SPRAY SCH: 1.5 SOLUTION NASAL at 16:03

## 2021-10-25 RX ADMIN — ASPIRIN 81 MG CHEWABLE TABLET SCH MG: 81 TABLET CHEWABLE at 08:31

## 2021-10-25 RX ADMIN — NIFEDIPINE SCH MG: 90 TABLET, EXTENDED RELEASE ORAL at 08:29

## 2021-11-18 ENCOUNTER — HOSPITAL ENCOUNTER (OUTPATIENT)
Dept: HOSPITAL 92 - CSHWCC | Age: 41
Discharge: HOME | End: 2021-11-18
Attending: NURSE PRACTITIONER
Payer: COMMERCIAL

## 2021-11-18 DIAGNOSIS — L57.0: Primary | ICD-10-CM

## 2021-11-18 PROCEDURE — 11102 TANGNTL BX SKIN SINGLE LES: CPT

## 2021-11-18 PROCEDURE — 99212 OFFICE O/P EST SF 10 MIN: CPT

## 2021-11-18 PROCEDURE — G0463 HOSPITAL OUTPT CLINIC VISIT: HCPCS

## 2021-11-18 PROCEDURE — 88305 TISSUE EXAM BY PATHOLOGIST: CPT

## 2021-12-09 ENCOUNTER — HOSPITAL ENCOUNTER (OUTPATIENT)
Dept: HOSPITAL 92 - CSHWCC | Age: 41
Discharge: HOME | End: 2021-12-09
Attending: NURSE PRACTITIONER
Payer: COMMERCIAL

## 2021-12-09 DIAGNOSIS — I10: ICD-10-CM

## 2021-12-09 DIAGNOSIS — E10.40: ICD-10-CM

## 2021-12-09 DIAGNOSIS — E10.65: ICD-10-CM

## 2021-12-09 DIAGNOSIS — I70.203: ICD-10-CM

## 2021-12-09 DIAGNOSIS — Z89.512: ICD-10-CM

## 2021-12-09 DIAGNOSIS — L89.893: Primary | ICD-10-CM

## 2021-12-09 PROCEDURE — G0463 HOSPITAL OUTPT CLINIC VISIT: HCPCS

## 2021-12-09 PROCEDURE — 99212 OFFICE O/P EST SF 10 MIN: CPT

## 2022-10-13 ENCOUNTER — HOSPITAL ENCOUNTER (INPATIENT)
Dept: HOSPITAL 92 - ERS | Age: 42
LOS: 6 days | Discharge: HOME | DRG: 673 | End: 2022-10-19
Attending: INTERNAL MEDICINE | Admitting: INTERNAL MEDICINE
Payer: COMMERCIAL

## 2022-10-13 VITALS — BODY MASS INDEX: 26.2 KG/M2

## 2022-10-13 DIAGNOSIS — D63.1: ICD-10-CM

## 2022-10-13 DIAGNOSIS — K31.84: ICD-10-CM

## 2022-10-13 DIAGNOSIS — Z98.41: ICD-10-CM

## 2022-10-13 DIAGNOSIS — Z89.512: ICD-10-CM

## 2022-10-13 DIAGNOSIS — Z79.899: ICD-10-CM

## 2022-10-13 DIAGNOSIS — Z98.42: ICD-10-CM

## 2022-10-13 DIAGNOSIS — N18.6: ICD-10-CM

## 2022-10-13 DIAGNOSIS — Z79.82: ICD-10-CM

## 2022-10-13 DIAGNOSIS — E78.5: ICD-10-CM

## 2022-10-13 DIAGNOSIS — Z79.4: ICD-10-CM

## 2022-10-13 DIAGNOSIS — E10.43: ICD-10-CM

## 2022-10-13 DIAGNOSIS — E10.22: ICD-10-CM

## 2022-10-13 DIAGNOSIS — I12.0: Primary | ICD-10-CM

## 2022-10-13 DIAGNOSIS — E87.20: ICD-10-CM

## 2022-10-13 LAB
ALBUMIN SERPL BCG-MCNC: 3.6 G/DL (ref 3.5–5)
ALP SERPL-CCNC: 56 U/L (ref 40–110)
ALT SERPL W P-5'-P-CCNC: 15 U/L (ref 8–55)
ANION GAP SERPL CALC-SCNC: 15 MMOL/L (ref 10–20)
AST SERPL-CCNC: 22 U/L (ref 5–34)
BASOPHILS # BLD AUTO: 0.1 THOU/UL (ref 0–0.2)
BASOPHILS NFR BLD AUTO: 1.1 % (ref 0–1)
BILIRUB SERPL-MCNC: 0.3 MG/DL (ref 0.2–1.2)
BUN SERPL-MCNC: 74 MG/DL (ref 8.9–20.6)
CALCIUM SERPL-MCNC: 8.9 MG/DL (ref 7.8–10.44)
CHLORIDE SERPL-SCNC: 105 MMOL/L (ref 98–107)
CO2 SERPL-SCNC: 24 MMOL/L (ref 22–29)
CREAT CL PREDICTED SERPL C-G-VRATE: 0 ML/MIN (ref 70–130)
EOSINOPHIL # BLD AUTO: 0.3 THOU/UL (ref 0–0.7)
EOSINOPHIL NFR BLD AUTO: 4.1 % (ref 0–10)
GLOBULIN SER CALC-MCNC: 2.5 G/DL (ref 2.4–3.5)
GLUCOSE SERPL-MCNC: 154 MG/DL (ref 70–105)
HGB BLD-MCNC: 7.1 G/DL (ref 14–18)
LYMPHOCYTES # BLD: 1.7 THOU/UL (ref 1.2–3.4)
LYMPHOCYTES NFR BLD AUTO: 24.4 % (ref 21–51)
MCH RBC QN AUTO: 32 PG (ref 27–31)
MCV RBC AUTO: 97.4 FL (ref 78–98)
MONOCYTES # BLD AUTO: 0.5 THOU/UL (ref 0.11–0.59)
MONOCYTES NFR BLD AUTO: 7.9 % (ref 0–10)
NEUTROPHILS # BLD AUTO: 4.3 THOU/UL (ref 1.4–6.5)
NEUTROPHILS NFR BLD AUTO: 62.6 % (ref 42–75)
PLATELET # BLD AUTO: 171 THOU/UL (ref 130–400)
POTASSIUM SERPL-SCNC: 4.6 MMOL/L (ref 3.5–5.1)
RBC # BLD AUTO: 2.2 MILL/UL (ref 4.7–6.1)
SODIUM SERPL-SCNC: 139 MMOL/L (ref 136–145)
WBC # BLD AUTO: 6.9 THOU/UL (ref 4.8–10.8)

## 2022-10-13 PROCEDURE — 86900 BLOOD TYPING SEROLOGIC ABO: CPT

## 2022-10-13 PROCEDURE — 86706 HEP B SURFACE ANTIBODY: CPT

## 2022-10-13 PROCEDURE — 71045 X-RAY EXAM CHEST 1 VIEW: CPT

## 2022-10-13 PROCEDURE — P9016 RBC LEUKOCYTES REDUCED: HCPCS

## 2022-10-13 PROCEDURE — C1751 CATH, INF, PER/CENT/MIDLINE: HCPCS

## 2022-10-13 PROCEDURE — C1776 JOINT DEVICE (IMPLANTABLE): HCPCS

## 2022-10-13 PROCEDURE — 74018 RADEX ABDOMEN 1 VIEW: CPT

## 2022-10-13 PROCEDURE — 86704 HEP B CORE ANTIBODY TOTAL: CPT

## 2022-10-13 PROCEDURE — S0020 INJECTION, BUPIVICAINE HYDRO: HCPCS

## 2022-10-13 PROCEDURE — 80053 COMPREHEN METABOLIC PANEL: CPT

## 2022-10-13 PROCEDURE — 36416 COLLJ CAPILLARY BLOOD SPEC: CPT

## 2022-10-13 PROCEDURE — 93005 ELECTROCARDIOGRAM TRACING: CPT

## 2022-10-13 PROCEDURE — 93970 EXTREMITY STUDY: CPT

## 2022-10-13 PROCEDURE — 80048 BASIC METABOLIC PNL TOTAL CA: CPT

## 2022-10-13 PROCEDURE — C9113 INJ PANTOPRAZOLE SODIUM, VIA: HCPCS

## 2022-10-13 PROCEDURE — 90935 HEMODIALYSIS ONE EVALUATION: CPT

## 2022-10-13 PROCEDURE — 86580 TB INTRADERMAL TEST: CPT

## 2022-10-13 PROCEDURE — 85025 COMPLETE CBC W/AUTO DIFF WBC: CPT

## 2022-10-13 PROCEDURE — 36415 COLL VENOUS BLD VENIPUNCTURE: CPT

## 2022-10-13 PROCEDURE — 86901 BLOOD TYPING SEROLOGIC RH(D): CPT

## 2022-10-13 PROCEDURE — 71046 X-RAY EXAM CHEST 2 VIEWS: CPT

## 2022-10-13 PROCEDURE — 36430 TRANSFUSION BLD/BLD COMPNT: CPT

## 2022-10-13 PROCEDURE — 86850 RBC ANTIBODY SCREEN: CPT

## 2022-10-13 PROCEDURE — 87340 HEPATITIS B SURFACE AG IA: CPT

## 2022-10-13 PROCEDURE — G0257 UNSCHED DIALYSIS ESRD PT HOS: HCPCS

## 2022-10-13 PROCEDURE — C1752 CATH,HEMODIALYSIS,SHORT-TERM: HCPCS

## 2022-10-14 LAB
ANION GAP SERPL CALC-SCNC: 17 MMOL/L (ref 10–20)
BASOPHILS # BLD AUTO: 0 THOU/UL (ref 0–0.2)
BASOPHILS NFR BLD AUTO: 0.7 % (ref 0–1)
BUN SERPL-MCNC: 70 MG/DL (ref 8.9–20.6)
CALCIUM SERPL-MCNC: 8.6 MG/DL (ref 7.8–10.44)
CHLORIDE SERPL-SCNC: 108 MMOL/L (ref 98–107)
CO2 SERPL-SCNC: 19 MMOL/L (ref 22–29)
CREAT CL PREDICTED SERPL C-G-VRATE: 18 ML/MIN (ref 70–130)
EOSINOPHIL # BLD AUTO: 0.2 THOU/UL (ref 0–0.7)
EOSINOPHIL NFR BLD AUTO: 3.4 % (ref 0–10)
GLUCOSE SERPL-MCNC: 137 MG/DL (ref 70–105)
HGB BLD-MCNC: 8 G/DL (ref 14–18)
LYMPHOCYTES # BLD: 1.3 THOU/UL (ref 1.2–3.4)
LYMPHOCYTES NFR BLD AUTO: 22 % (ref 21–51)
MCH RBC QN AUTO: 31.8 PG (ref 27–31)
MCV RBC AUTO: 97.1 FL (ref 78–98)
MONOCYTES # BLD AUTO: 0.5 THOU/UL (ref 0.11–0.59)
MONOCYTES NFR BLD AUTO: 9.5 % (ref 0–10)
NEUTROPHILS # BLD AUTO: 3.7 THOU/UL (ref 1.4–6.5)
NEUTROPHILS NFR BLD AUTO: 64.4 % (ref 42–75)
PLATELET # BLD AUTO: 167 THOU/UL (ref 130–400)
POTASSIUM SERPL-SCNC: 4.5 MMOL/L (ref 3.5–5.1)
RBC # BLD AUTO: 2.51 MILL/UL (ref 4.7–6.1)
SODIUM SERPL-SCNC: 139 MMOL/L (ref 136–145)
WBC # BLD AUTO: 5.8 THOU/UL (ref 4.8–10.8)

## 2022-10-14 PROCEDURE — 0WHG43Z INSERTION OF INFUSION DEVICE INTO PERITONEAL CAVITY, PERCUTANEOUS ENDOSCOPIC APPROACH: ICD-10-PCS | Performed by: SURGERY

## 2022-10-14 PROCEDURE — B548ZZA ULTRASONOGRAPHY OF SUPERIOR VENA CAVA, GUIDANCE: ICD-10-PCS | Performed by: SURGERY

## 2022-10-14 PROCEDURE — 30233N1 TRANSFUSION OF NONAUTOLOGOUS RED BLOOD CELLS INTO PERIPHERAL VEIN, PERCUTANEOUS APPROACH: ICD-10-PCS | Performed by: SURGERY

## 2022-10-14 PROCEDURE — 02H633Z INSERTION OF INFUSION DEVICE INTO RIGHT ATRIUM, PERCUTANEOUS APPROACH: ICD-10-PCS | Performed by: SURGERY

## 2022-10-14 RX ADMIN — INSULIN LISPRO PRN UNIT: 100 INJECTION, SOLUTION INTRAVENOUS; SUBCUTANEOUS at 21:48

## 2022-10-14 RX ADMIN — ONDANSETRON PRN MG: 2 INJECTION INTRAMUSCULAR; INTRAVENOUS at 04:06

## 2022-10-14 RX ADMIN — NIFEDIPINE SCH MG: 90 TABLET, EXTENDED RELEASE ORAL at 17:22

## 2022-10-14 RX ADMIN — INSULIN GLARGINE SCH: 100 INJECTION, SOLUTION SUBCUTANEOUS at 08:30

## 2022-10-14 RX ADMIN — ONDANSETRON PRN MG: 2 INJECTION INTRAMUSCULAR; INTRAVENOUS at 08:54

## 2022-10-14 RX ADMIN — ONDANSETRON PRN MG: 2 INJECTION INTRAMUSCULAR; INTRAVENOUS at 19:44

## 2022-10-15 LAB
ANION GAP SERPL CALC-SCNC: 25 MMOL/L (ref 10–20)
BASOPHILS # BLD AUTO: 0 THOU/UL (ref 0–0.2)
BASOPHILS NFR BLD AUTO: 0.1 % (ref 0–1)
BUN SERPL-MCNC: 88 MG/DL (ref 8.9–20.6)
CALCIUM SERPL-MCNC: 8.7 MG/DL (ref 7.8–10.44)
CHLORIDE SERPL-SCNC: 102 MMOL/L (ref 98–107)
CO2 SERPL-SCNC: 13 MMOL/L (ref 22–29)
CREAT CL PREDICTED SERPL C-G-VRATE: 16 ML/MIN (ref 70–130)
EOSINOPHIL # BLD AUTO: 0 THOU/UL (ref 0–0.7)
EOSINOPHIL NFR BLD AUTO: 0.1 % (ref 0–10)
GLUCOSE SERPL-MCNC: 285 MG/DL (ref 70–105)
HGB BLD-MCNC: 8.5 G/DL (ref 14–18)
LYMPHOCYTES # BLD: 1.3 THOU/UL (ref 1.2–3.4)
LYMPHOCYTES NFR BLD AUTO: 9.6 % (ref 21–51)
MCH RBC QN AUTO: 31.8 PG (ref 27–31)
MCV RBC AUTO: 96.6 FL (ref 78–98)
MONOCYTES # BLD AUTO: 0.8 THOU/UL (ref 0.11–0.59)
MONOCYTES NFR BLD AUTO: 6 % (ref 0–10)
NEUTROPHILS # BLD AUTO: 11.6 THOU/UL (ref 1.4–6.5)
NEUTROPHILS NFR BLD AUTO: 84.3 % (ref 42–75)
PLATELET # BLD AUTO: 168 THOU/UL (ref 130–400)
POTASSIUM SERPL-SCNC: 4.4 MMOL/L (ref 3.5–5.1)
RBC # BLD AUTO: 2.67 MILL/UL (ref 4.7–6.1)
SODIUM SERPL-SCNC: 136 MMOL/L (ref 136–145)
WBC # BLD AUTO: 13.8 THOU/UL (ref 4.8–10.8)

## 2022-10-15 RX ADMIN — NIFEDIPINE SCH: 90 TABLET, EXTENDED RELEASE ORAL at 10:21

## 2022-10-15 RX ADMIN — ONDANSETRON PRN MG: 2 INJECTION INTRAMUSCULAR; INTRAVENOUS at 10:15

## 2022-10-15 RX ADMIN — INSULIN LISPRO PRN UNIT: 100 INJECTION, SOLUTION INTRAVENOUS; SUBCUTANEOUS at 21:06

## 2022-10-15 RX ADMIN — ALUMINUM HYDROXIDE AND MAGNESIUM HYDROXIDE PRN ML: 200; 200 SUSPENSION ORAL at 11:57

## 2022-10-15 RX ADMIN — INSULIN LISPRO PRN UNIT: 100 INJECTION, SOLUTION INTRAVENOUS; SUBCUTANEOUS at 05:51

## 2022-10-15 RX ADMIN — INSULIN GLARGINE SCH UNITS: 100 INJECTION, SOLUTION SUBCUTANEOUS at 16:18

## 2022-10-15 RX ADMIN — INSULIN GLARGINE SCH: 100 INJECTION, SOLUTION SUBCUTANEOUS at 10:19

## 2022-10-15 RX ADMIN — ONDANSETRON PRN MG: 2 INJECTION INTRAMUSCULAR; INTRAVENOUS at 04:30

## 2022-10-15 RX ADMIN — INSULIN LISPRO PRN UNIT: 100 INJECTION, SOLUTION INTRAVENOUS; SUBCUTANEOUS at 16:20

## 2022-10-15 RX ADMIN — Medication PRN ML: at 16:03

## 2022-10-16 LAB
ANION GAP SERPL CALC-SCNC: 25 MMOL/L (ref 10–20)
BASOPHILS # BLD AUTO: 0 THOU/UL (ref 0–0.2)
BASOPHILS NFR BLD AUTO: 0.1 % (ref 0–1)
BUN SERPL-MCNC: 100 MG/DL (ref 8.9–20.6)
CALCIUM SERPL-MCNC: 7.8 MG/DL (ref 7.8–10.44)
CHLORIDE SERPL-SCNC: 94 MMOL/L (ref 98–107)
CO2 SERPL-SCNC: 15 MMOL/L (ref 22–29)
CREAT CL PREDICTED SERPL C-G-VRATE: 14 ML/MIN (ref 70–130)
EOSINOPHIL # BLD AUTO: 0 THOU/UL (ref 0–0.7)
EOSINOPHIL NFR BLD AUTO: 0.1 % (ref 0–10)
GLUCOSE SERPL-MCNC: 269 MG/DL (ref 70–105)
HGB BLD-MCNC: 8.1 G/DL (ref 14–18)
LYMPHOCYTES # BLD: 0.9 THOU/UL (ref 1.2–3.4)
LYMPHOCYTES NFR BLD AUTO: 7.4 % (ref 21–51)
MCH RBC QN AUTO: 31.3 PG (ref 27–31)
MCV RBC AUTO: 95.7 FL (ref 78–98)
MONOCYTES # BLD AUTO: 0.8 THOU/UL (ref 0.11–0.59)
MONOCYTES NFR BLD AUTO: 6.5 % (ref 0–10)
NEUTROPHILS # BLD AUTO: 10.7 THOU/UL (ref 1.4–6.5)
NEUTROPHILS NFR BLD AUTO: 85.9 % (ref 42–75)
PLATELET # BLD AUTO: 146 THOU/UL (ref 130–400)
POTASSIUM SERPL-SCNC: 4 MMOL/L (ref 3.5–5.1)
RBC # BLD AUTO: 2.58 MILL/UL (ref 4.7–6.1)
SODIUM SERPL-SCNC: 130 MMOL/L (ref 136–145)
WBC # BLD AUTO: 12.5 THOU/UL (ref 4.8–10.8)

## 2022-10-16 RX ADMIN — INSULIN LISPRO PRN UNIT: 100 INJECTION, SOLUTION INTRAVENOUS; SUBCUTANEOUS at 11:52

## 2022-10-16 RX ADMIN — NIFEDIPINE SCH: 90 TABLET, EXTENDED RELEASE ORAL at 10:21

## 2022-10-16 RX ADMIN — INSULIN GLARGINE SCH UNITS: 100 INJECTION, SOLUTION SUBCUTANEOUS at 10:20

## 2022-10-17 LAB
ANION GAP SERPL CALC-SCNC: 19 MMOL/L (ref 10–20)
BASOPHILS # BLD AUTO: 0 THOU/UL (ref 0–0.2)
BASOPHILS NFR BLD AUTO: 0.2 % (ref 0–1)
BUN SERPL-MCNC: 107 MG/DL (ref 8.9–20.6)
CALCIUM SERPL-MCNC: 8.1 MG/DL (ref 7.8–10.44)
CHLORIDE SERPL-SCNC: 95 MMOL/L (ref 98–107)
CO2 SERPL-SCNC: 21 MMOL/L (ref 22–29)
CREAT CL PREDICTED SERPL C-G-VRATE: 14 ML/MIN (ref 70–130)
EOSINOPHIL # BLD AUTO: 0 THOU/UL (ref 0–0.7)
EOSINOPHIL NFR BLD AUTO: 0.2 % (ref 0–10)
GLUCOSE SERPL-MCNC: 94 MG/DL (ref 70–105)
HBSAG INDEX: 0.46 S/CO (ref 0–0.99)
HBV SURFACE AB SERPL IA-ACNC: (no result) MIU/ML
HGB BLD-MCNC: 8.7 G/DL (ref 14–18)
LYMPHOCYTES # BLD: 1 THOU/UL (ref 1.2–3.4)
LYMPHOCYTES NFR BLD AUTO: 9.5 % (ref 21–51)
MCH RBC QN AUTO: 31.7 PG (ref 27–31)
MCV RBC AUTO: 94.3 FL (ref 78–98)
MONOCYTES # BLD AUTO: 0.9 THOU/UL (ref 0.11–0.59)
MONOCYTES NFR BLD AUTO: 8.5 % (ref 0–10)
NEUTROPHILS # BLD AUTO: 8.7 THOU/UL (ref 1.4–6.5)
NEUTROPHILS NFR BLD AUTO: 81.5 % (ref 42–75)
PLATELET # BLD AUTO: 144 THOU/UL (ref 130–400)
POTASSIUM SERPL-SCNC: 3.3 MMOL/L (ref 3.5–5.1)
RBC # BLD AUTO: 2.75 MILL/UL (ref 4.7–6.1)
SODIUM SERPL-SCNC: 132 MMOL/L (ref 136–145)
WBC # BLD AUTO: 10.7 THOU/UL (ref 4.8–10.8)

## 2022-10-17 PROCEDURE — B548ZZA ULTRASONOGRAPHY OF SUPERIOR VENA CAVA, GUIDANCE: ICD-10-PCS | Performed by: SURGERY

## 2022-10-17 PROCEDURE — 5A1D70Z PERFORMANCE OF URINARY FILTRATION, INTERMITTENT, LESS THAN 6 HOURS PER DAY: ICD-10-PCS | Performed by: SURGERY

## 2022-10-17 PROCEDURE — 0JH63XZ INSERTION OF TUNNELED VASCULAR ACCESS DEVICE INTO CHEST SUBCUTANEOUS TISSUE AND FASCIA, PERCUTANEOUS APPROACH: ICD-10-PCS | Performed by: SURGERY

## 2022-10-17 PROCEDURE — 02HV33Z INSERTION OF INFUSION DEVICE INTO SUPERIOR VENA CAVA, PERCUTANEOUS APPROACH: ICD-10-PCS | Performed by: SURGERY

## 2022-10-17 PROCEDURE — B5181ZA FLUOROSCOPY OF SUPERIOR VENA CAVA USING LOW OSMOLAR CONTRAST, GUIDANCE: ICD-10-PCS | Performed by: SURGERY

## 2022-10-17 RX ADMIN — NIFEDIPINE SCH: 90 TABLET, EXTENDED RELEASE ORAL at 11:04

## 2022-10-17 RX ADMIN — INSULIN GLARGINE SCH UNITS: 100 INJECTION, SOLUTION SUBCUTANEOUS at 11:03

## 2022-10-18 LAB
HBSAG INDEX: 0.47 S/CO (ref 0–0.99)
HBV SURFACE AB SERPL IA-ACNC: (no result) MIU/ML
HEP B CORE TOTAL INDEX: 0.13 S/CO (ref 0–0.79)
HEP C INDEX: 0.09 S/CO (ref 0–0.79)

## 2022-10-18 RX ADMIN — NIFEDIPINE SCH: 90 TABLET, EXTENDED RELEASE ORAL at 08:40

## 2022-10-18 RX ADMIN — INSULIN GLARGINE SCH UNITS: 100 INJECTION, SOLUTION SUBCUTANEOUS at 08:20

## 2022-10-18 RX ADMIN — Medication PRN ML: at 22:11

## 2022-10-19 VITALS — DIASTOLIC BLOOD PRESSURE: 82 MMHG | TEMPERATURE: 98.6 F | SYSTOLIC BLOOD PRESSURE: 172 MMHG

## 2022-10-19 LAB
ANION GAP SERPL CALC-SCNC: 15 MMOL/L (ref 10–20)
BUN SERPL-MCNC: 55 MG/DL (ref 8.9–20.6)
CALCIUM SERPL-MCNC: 7.9 MG/DL (ref 7.8–10.44)
CHLORIDE SERPL-SCNC: 98 MMOL/L (ref 98–107)
CO2 SERPL-SCNC: 25 MMOL/L (ref 22–29)
CREAT CL PREDICTED SERPL C-G-VRATE: 24 ML/MIN (ref 70–130)
GLUCOSE SERPL-MCNC: 112 MG/DL (ref 70–105)
POTASSIUM SERPL-SCNC: 3.4 MMOL/L (ref 3.5–5.1)
SODIUM SERPL-SCNC: 135 MMOL/L (ref 136–145)

## 2022-10-19 RX ADMIN — ALUMINUM HYDROXIDE AND MAGNESIUM HYDROXIDE PRN ML: 200; 200 SUSPENSION ORAL at 13:50

## 2022-10-19 RX ADMIN — INSULIN GLARGINE SCH UNITS: 100 INJECTION, SOLUTION SUBCUTANEOUS at 09:46

## 2022-10-19 RX ADMIN — NIFEDIPINE SCH MG: 90 TABLET, EXTENDED RELEASE ORAL at 09:48

## 2024-01-25 ENCOUNTER — HOSPITAL ENCOUNTER (OUTPATIENT)
Dept: HOSPITAL 92 - CSHWCC | Age: 44
Discharge: HOME | End: 2024-01-25
Attending: PHYSICIAN ASSISTANT
Payer: MEDICARE

## 2024-01-25 DIAGNOSIS — L89.892: ICD-10-CM

## 2024-01-25 DIAGNOSIS — E10.622: Primary | ICD-10-CM

## 2024-01-25 DIAGNOSIS — E10.51: ICD-10-CM

## 2024-01-25 DIAGNOSIS — Z89.512: ICD-10-CM

## 2024-01-25 PROCEDURE — 99213 OFFICE O/P EST LOW 20 MIN: CPT

## 2024-01-25 PROCEDURE — G0463 HOSPITAL OUTPT CLINIC VISIT: HCPCS

## 2024-01-25 PROCEDURE — 97597 DBRDMT OPN WND 1ST 20 CM/<: CPT

## 2024-02-08 ENCOUNTER — HOSPITAL ENCOUNTER (OUTPATIENT)
Dept: HOSPITAL 92 - CSHWCC | Age: 44
Discharge: HOME | End: 2024-02-08
Attending: FAMILY MEDICINE
Payer: MEDICARE

## 2024-02-08 DIAGNOSIS — E10.622: Primary | ICD-10-CM

## 2024-02-08 DIAGNOSIS — Z89.512: ICD-10-CM

## 2024-02-08 DIAGNOSIS — L89.892: ICD-10-CM

## 2024-02-08 DIAGNOSIS — I73.9: ICD-10-CM

## 2024-02-08 PROCEDURE — 99212 OFFICE O/P EST SF 10 MIN: CPT

## 2024-02-08 PROCEDURE — 97597 DBRDMT OPN WND 1ST 20 CM/<: CPT

## 2024-02-08 PROCEDURE — G0463 HOSPITAL OUTPT CLINIC VISIT: HCPCS

## 2024-02-29 ENCOUNTER — HOSPITAL ENCOUNTER (OUTPATIENT)
Dept: HOSPITAL 92 - CSHWCC | Age: 44
Discharge: HOME | End: 2024-02-29
Attending: FAMILY MEDICINE
Payer: MEDICARE

## 2024-02-29 DIAGNOSIS — L89.892: Primary | ICD-10-CM

## 2024-02-29 DIAGNOSIS — Z89.512: ICD-10-CM

## 2024-02-29 DIAGNOSIS — E10.622: ICD-10-CM

## 2024-02-29 DIAGNOSIS — I73.9: ICD-10-CM

## 2024-02-29 PROCEDURE — 11042 DBRDMT SUBQ TIS 1ST 20SQCM/<: CPT
